# Patient Record
Sex: MALE | Race: WHITE | NOT HISPANIC OR LATINO
[De-identification: names, ages, dates, MRNs, and addresses within clinical notes are randomized per-mention and may not be internally consistent; named-entity substitution may affect disease eponyms.]

---

## 2017-02-01 ENCOUNTER — MEDICATION RENEWAL (OUTPATIENT)
Age: 62
End: 2017-02-01

## 2017-09-12 ENCOUNTER — NON-APPOINTMENT (OUTPATIENT)
Age: 62
End: 2017-09-12

## 2017-09-12 ENCOUNTER — APPOINTMENT (OUTPATIENT)
Dept: FAMILY MEDICINE | Facility: CLINIC | Age: 62
End: 2017-09-12
Payer: COMMERCIAL

## 2017-09-12 VITALS
SYSTOLIC BLOOD PRESSURE: 122 MMHG | BODY MASS INDEX: 29.66 KG/M2 | HEIGHT: 72 IN | WEIGHT: 219 LBS | DIASTOLIC BLOOD PRESSURE: 74 MMHG

## 2017-09-12 DIAGNOSIS — Z87.898 PERSONAL HISTORY OF OTHER SPECIFIED CONDITIONS: ICD-10-CM

## 2017-09-12 DIAGNOSIS — Z92.89 PERSONAL HISTORY OF OTHER MEDICAL TREATMENT: ICD-10-CM

## 2017-09-12 DIAGNOSIS — Z82.49 FAMILY HISTORY OF ISCHEMIC HEART DISEASE AND OTHER DISEASES OF THE CIRCULATORY SYSTEM: ICD-10-CM

## 2017-09-12 DIAGNOSIS — D49.0 NEOPLASM OF UNSPECIFIED BEHAVIOR OF DIGESTIVE SYSTEM: ICD-10-CM

## 2017-09-12 DIAGNOSIS — L98.9 DISORDER OF THE SKIN AND SUBCUTANEOUS TISSUE, UNSPECIFIED: ICD-10-CM

## 2017-09-12 DIAGNOSIS — Z83.3 FAMILY HISTORY OF DIABETES MELLITUS: ICD-10-CM

## 2017-09-12 DIAGNOSIS — Z82.3 FAMILY HISTORY OF STROKE: ICD-10-CM

## 2017-09-12 LAB
BILIRUB UR QL STRIP: NEGATIVE
GLUCOSE UR-MCNC: NEGATIVE
HGB UR QL STRIP.AUTO: NEGATIVE
KETONES UR-MCNC: NEGATIVE
LEUKOCYTE ESTERASE UR QL STRIP: NEGATIVE
NITRITE UR QL STRIP: NEGATIVE
PH UR STRIP: 6
SP GR UR STRIP: 1

## 2017-09-12 PROCEDURE — 90472 IMMUNIZATION ADMIN EACH ADD: CPT

## 2017-09-12 PROCEDURE — 81003 URINALYSIS AUTO W/O SCOPE: CPT | Mod: QW

## 2017-09-12 PROCEDURE — 99396 PREV VISIT EST AGE 40-64: CPT | Mod: 25

## 2017-09-12 PROCEDURE — 90686 IIV4 VACC NO PRSV 0.5 ML IM: CPT

## 2017-09-12 PROCEDURE — 93000 ELECTROCARDIOGRAM COMPLETE: CPT

## 2017-09-12 PROCEDURE — G0008: CPT

## 2017-09-12 PROCEDURE — 90670 PCV13 VACCINE IM: CPT

## 2017-09-12 PROCEDURE — 90688 IIV4 VACCINE SPLT 0.5 ML IM: CPT

## 2017-10-11 ENCOUNTER — RESULT REVIEW (OUTPATIENT)
Age: 62
End: 2017-10-11

## 2017-10-12 ENCOUNTER — MED ADMIN CHARGE (OUTPATIENT)
Age: 62
End: 2017-10-12

## 2017-10-12 ENCOUNTER — APPOINTMENT (OUTPATIENT)
Dept: FAMILY MEDICINE | Facility: CLINIC | Age: 62
End: 2017-10-12
Payer: COMMERCIAL

## 2017-10-12 ENCOUNTER — APPOINTMENT (OUTPATIENT)
Dept: DERMATOLOGY | Facility: CLINIC | Age: 62
End: 2017-10-12
Payer: COMMERCIAL

## 2017-10-12 VITALS
SYSTOLIC BLOOD PRESSURE: 123 MMHG | WEIGHT: 219.5 LBS | BODY MASS INDEX: 29.73 KG/M2 | DIASTOLIC BLOOD PRESSURE: 75 MMHG | HEIGHT: 72 IN

## 2017-10-12 PROCEDURE — 90736 HZV VACCINE LIVE SUBQ: CPT

## 2017-10-12 PROCEDURE — 90471 IMMUNIZATION ADMIN: CPT

## 2017-10-12 PROCEDURE — 11100 BX SKIN SUBCUTANEOUS&/MUCOUS MEMBRANE 1 LESION: CPT

## 2017-10-12 PROCEDURE — 99214 OFFICE O/P EST MOD 30 MIN: CPT | Mod: 25

## 2018-06-20 ENCOUNTER — APPOINTMENT (OUTPATIENT)
Dept: FAMILY MEDICINE | Facility: CLINIC | Age: 63
End: 2018-06-20
Payer: COMMERCIAL

## 2018-06-20 VITALS
HEIGHT: 72 IN | DIASTOLIC BLOOD PRESSURE: 76 MMHG | SYSTOLIC BLOOD PRESSURE: 118 MMHG | WEIGHT: 217 LBS | BODY MASS INDEX: 29.39 KG/M2

## 2018-06-20 DIAGNOSIS — Z92.29 PERSONAL HISTORY OF OTHER DRUG THERAPY: ICD-10-CM

## 2018-06-20 DIAGNOSIS — Z23 ENCOUNTER FOR IMMUNIZATION: ICD-10-CM

## 2018-06-20 PROCEDURE — 99214 OFFICE O/P EST MOD 30 MIN: CPT

## 2018-06-20 NOTE — HISTORY OF PRESENT ILLNESS
[FreeTextEntry1] : Pt here to review cardio consult from 10/2017. Pt states the cardiologist recommended him starting a statin and pt would like to discuss this further. Had coronary calcium score of 13.8 in 11/21/18. Pt prefers not to take a statin. He is not certain of the benefit given his current profile and is concerned for side effects. [de-identified] : No hx of cp, sob or palpitations. Never smoked.\par Brother age 74 to have CABG.

## 2018-06-20 NOTE — DATA REVIEWED
[FreeTextEntry1] : 10 yr ASCVD risk score is 7.7%\par Labs 9/14/17 is  Tot chol  168,  Trig  109,  HDL  50,  LDL 96\par Exercise Stress Test: Neg 1/12/17

## 2018-06-20 NOTE — PHYSICAL EXAM
[No Acute Distress] : no acute distress [Supple] : supple [No Respiratory Distress] : no respiratory distress  [Clear to Auscultation] : lungs were clear to auscultation bilaterally [Normal Rate] : normal rate  [Regular Rhythm] : with a regular rhythm [Normal S1, S2] : normal S1 and S2 [No Murmur] : no murmur heard [No Edema] : there was no peripheral edema

## 2018-06-20 NOTE — ASSESSMENT
[FreeTextEntry1] : Pt with intermediate 10 yr ASCVD risk by borderline result (<7.5% low risk).\par He is reluctant to take statin. LDL 96. Coronary Calcium 13.8. We discussed the medical data that supports statin therapy with proven cardiovascular risk reduction and that LDL <70 is a goal consistent with aggressive risk reduction. Pt is technically at intermediate cardiovascular risk based on 9/2017 labs. He is aware that deferring statin therapy will keep him at a higher risk. \par Recommend ASA 81 mg/d for cardiovascular risk reduction.\par Will base statin therapy recommendations on updated lab results and engage pt in decision making.\par Plant based diet shown to be beneficial in reducing atherosclerosis. Pt given copy of [employer] for healthy food choices.\par \par

## 2018-06-20 NOTE — HEALTH RISK ASSESSMENT
[No falls in past year] : Patient reported no falls in the past year [0] : 2) Feeling down, depressed, or hopeless: Not at all (0) [] : No [[id number]] : 0

## 2018-11-28 ENCOUNTER — APPOINTMENT (OUTPATIENT)
Dept: FAMILY MEDICINE | Facility: CLINIC | Age: 63
End: 2018-11-28
Payer: COMMERCIAL

## 2018-11-28 ENCOUNTER — MED ADMIN CHARGE (OUTPATIENT)
Age: 63
End: 2018-11-28

## 2018-11-28 VITALS
SYSTOLIC BLOOD PRESSURE: 118 MMHG | HEIGHT: 72 IN | HEART RATE: 59 BPM | WEIGHT: 214 LBS | BODY MASS INDEX: 28.99 KG/M2 | OXYGEN SATURATION: 96 % | DIASTOLIC BLOOD PRESSURE: 78 MMHG

## 2018-11-28 PROCEDURE — G0008: CPT

## 2018-11-28 PROCEDURE — 90686 IIV4 VACC NO PRSV 0.5 ML IM: CPT

## 2018-12-11 ENCOUNTER — APPOINTMENT (OUTPATIENT)
Dept: DERMATOLOGY | Facility: CLINIC | Age: 63
End: 2018-12-11
Payer: COMMERCIAL

## 2018-12-11 PROCEDURE — 99214 OFFICE O/P EST MOD 30 MIN: CPT

## 2018-12-13 ENCOUNTER — NON-APPOINTMENT (OUTPATIENT)
Age: 63
End: 2018-12-13

## 2018-12-13 ENCOUNTER — APPOINTMENT (OUTPATIENT)
Dept: FAMILY MEDICINE | Facility: CLINIC | Age: 63
End: 2018-12-13
Payer: COMMERCIAL

## 2018-12-13 VITALS — HEART RATE: 57 BPM | SYSTOLIC BLOOD PRESSURE: 130 MMHG | OXYGEN SATURATION: 100 % | DIASTOLIC BLOOD PRESSURE: 80 MMHG

## 2018-12-13 DIAGNOSIS — Z92.29 PERSONAL HISTORY OF OTHER DRUG THERAPY: ICD-10-CM

## 2018-12-13 LAB
BILIRUB UR QL STRIP: NEGATIVE
CLARITY UR: CLEAR
COLLECTION METHOD: NORMAL
GLUCOSE UR-MCNC: NEGATIVE
HCG UR QL: 0.2 EU/DL
HGB UR QL STRIP.AUTO: NEGATIVE
KETONES UR-MCNC: NEGATIVE
LEUKOCYTE ESTERASE UR QL STRIP: NEGATIVE
NITRITE UR QL STRIP: NEGATIVE
PH UR STRIP: 6
PROT UR STRIP-MCNC: NEGATIVE
SP GR UR STRIP: 1

## 2018-12-13 PROCEDURE — 90750 HZV VACC RECOMBINANT IM: CPT

## 2018-12-13 PROCEDURE — 81003 URINALYSIS AUTO W/O SCOPE: CPT | Mod: QW

## 2018-12-13 PROCEDURE — 90471 IMMUNIZATION ADMIN: CPT

## 2018-12-13 PROCEDURE — 99396 PREV VISIT EST AGE 40-64: CPT | Mod: 25

## 2018-12-13 PROCEDURE — 93000 ELECTROCARDIOGRAM COMPLETE: CPT

## 2018-12-13 RX ORDER — GABAPENTIN 100 MG/1
100 CAPSULE ORAL DAILY
Qty: 180 | Refills: 2 | Status: DISCONTINUED | COMMUNITY
End: 2018-12-13

## 2018-12-13 RX ORDER — ASPIRIN 81 MG/1
81 TABLET ORAL DAILY
Qty: 90 | Refills: 3 | Status: DISCONTINUED | COMMUNITY
Start: 2018-06-20 | End: 2018-12-13

## 2018-12-16 NOTE — DATA REVIEWED
[FreeTextEntry1] : EKG: sinus bradycardia 53 bpm. WNL. No acute change.\par 10 yr ASCVD calculated risk is 9.3 %.  Was 7.7%.\par Hx of coronary calcium score of 13.8.\par PSA 1.6 at urology office recently according to pt.

## 2018-12-16 NOTE — HISTORY OF PRESENT ILLNESS
[FreeTextEntry1] : Patient is here for a Physical.\par Pt c/o vague left parasternal sensation after exercising earlier in the week. No associated sob, palpitations or lightheadedness. Nonradiating, fleeting discomfort. Pt exercises regularly several times/wk including aerobic and strength training w/o cp, sob, palpitations or lightheadedness. [de-identified] : States diet is healthy and low cholesterol.\par He has concern for his cardiovascular risk and has questions regarding appropriate primary prevention. He recently stopped his ASA 81 mg/d based on reading about a study, in the news, showing limited benefit for prevention in people w/o known cardiac disease (Presumbably the ASPREE study).

## 2018-12-16 NOTE — HEALTH RISK ASSESSMENT
[No falls in past year] : Patient reported no falls in the past year [0] : 2) Feeling down, depressed, or hopeless: Not at all (0) [] : No [de-identified] : socially  [DCM0Tbhys] : 0 [ColonoscopyDate] : 07/2016 [ColonoscopyComments] : Dr. Matthew Araiza.

## 2018-12-16 NOTE — ASSESSMENT
[FreeTextEntry1] : Risks, benefits and potential side effects of the Shingrix vaccine was reviewed with the patient, including risk for low grade fever, myalgias, fatigue, malaise, headache and redness or soreness at the site of the injection. The pt is aware that two shots are recommended, two to six months apart, to complete the Shingrix vaccination series.\par Mild elevation in BP today.  Chest pain episode was fleeting and atypical for cardiac related pain.The EKG is within normal limits. Pt is advised to f/u with his cardiologist. Recommend F/U in 3 months. \par \par Pt at intermediate cardiovascular risk with CAC score of 13.8 and + FHx of CAD. Would recommend low dose statin based on current AHA guidelines for primary prevention. Pt would like an opinion from his cardiologist on this subject.  Will defer ASA based on ASPREE study results in context of no known current active cardiovascular disease. Further recommendations pending bw.\par

## 2019-01-08 ENCOUNTER — TRANSCRIPTION ENCOUNTER (OUTPATIENT)
Age: 64
End: 2019-01-08

## 2019-01-10 ENCOUNTER — TRANSCRIPTION ENCOUNTER (OUTPATIENT)
Age: 64
End: 2019-01-10

## 2019-01-14 ENCOUNTER — MEDICATION RENEWAL (OUTPATIENT)
Age: 64
End: 2019-01-14

## 2019-03-05 ENCOUNTER — TRANSCRIPTION ENCOUNTER (OUTPATIENT)
Age: 64
End: 2019-03-05

## 2019-04-08 ENCOUNTER — RX RENEWAL (OUTPATIENT)
Age: 64
End: 2019-04-08

## 2019-04-11 ENCOUNTER — APPOINTMENT (OUTPATIENT)
Dept: FAMILY MEDICINE | Facility: CLINIC | Age: 64
End: 2019-04-11

## 2019-04-18 ENCOUNTER — APPOINTMENT (OUTPATIENT)
Dept: FAMILY MEDICINE | Facility: CLINIC | Age: 64
End: 2019-04-18

## 2019-04-24 ENCOUNTER — APPOINTMENT (OUTPATIENT)
Dept: FAMILY MEDICINE | Facility: CLINIC | Age: 64
End: 2019-04-24
Payer: COMMERCIAL

## 2019-04-24 VITALS
DIASTOLIC BLOOD PRESSURE: 70 MMHG | SYSTOLIC BLOOD PRESSURE: 120 MMHG | BODY MASS INDEX: 29.19 KG/M2 | HEART RATE: 51 BPM | WEIGHT: 215.5 LBS | OXYGEN SATURATION: 99 % | HEIGHT: 72 IN

## 2019-04-24 PROCEDURE — 90471 IMMUNIZATION ADMIN: CPT

## 2019-04-24 PROCEDURE — 90750 HZV VACC RECOMBINANT IM: CPT

## 2019-05-01 ENCOUNTER — APPOINTMENT (OUTPATIENT)
Dept: FAMILY MEDICINE | Facility: CLINIC | Age: 64
End: 2019-05-01

## 2019-05-09 ENCOUNTER — RX CHANGE (OUTPATIENT)
Age: 64
End: 2019-05-09

## 2019-05-09 ENCOUNTER — RX RENEWAL (OUTPATIENT)
Age: 64
End: 2019-05-09

## 2019-06-19 ENCOUNTER — TRANSCRIPTION ENCOUNTER (OUTPATIENT)
Age: 64
End: 2019-06-19

## 2019-07-25 ENCOUNTER — TRANSCRIPTION ENCOUNTER (OUTPATIENT)
Age: 64
End: 2019-07-25

## 2019-07-26 ENCOUNTER — TRANSCRIPTION ENCOUNTER (OUTPATIENT)
Age: 64
End: 2019-07-26

## 2019-07-29 ENCOUNTER — APPOINTMENT (OUTPATIENT)
Dept: FAMILY MEDICINE | Facility: CLINIC | Age: 64
End: 2019-07-29
Payer: COMMERCIAL

## 2019-07-29 VITALS
RESPIRATION RATE: 12 BRPM | OXYGEN SATURATION: 99 % | DIASTOLIC BLOOD PRESSURE: 70 MMHG | BODY MASS INDEX: 27.77 KG/M2 | HEART RATE: 54 BPM | WEIGHT: 205 LBS | HEIGHT: 72 IN | TEMPERATURE: 97.1 F | SYSTOLIC BLOOD PRESSURE: 110 MMHG

## 2019-07-29 DIAGNOSIS — Z87.39 PERSONAL HISTORY OF OTHER DISEASES OF THE MUSCULOSKELETAL SYSTEM AND CONNECTIVE TISSUE: ICD-10-CM

## 2019-07-29 DIAGNOSIS — M75.51 BURSITIS OF RIGHT SHOULDER: ICD-10-CM

## 2019-07-29 PROCEDURE — 36415 COLL VENOUS BLD VENIPUNCTURE: CPT

## 2019-07-29 PROCEDURE — 99214 OFFICE O/P EST MOD 30 MIN: CPT | Mod: 25

## 2019-07-29 RX ORDER — MULTIVIT-MIN/FOLIC/VIT K/LYCOP 400-300MCG
25 MCG TABLET ORAL DAILY
Refills: 0 | Status: DISCONTINUED | COMMUNITY
End: 2019-07-29

## 2019-07-29 RX ORDER — ALLOPURINOL 200 MG/1
TABLET ORAL EVERY OTHER DAY
Refills: 0 | Status: DISCONTINUED | COMMUNITY
End: 2019-07-29

## 2019-07-30 ENCOUNTER — RESULT REVIEW (OUTPATIENT)
Age: 64
End: 2019-07-30

## 2019-07-30 LAB
25(OH)D3 SERPL-MCNC: 35.3 NG/ML
ALBUMIN SERPL ELPH-MCNC: 4.6 G/DL
ALP BLD-CCNC: 56 U/L
ALT SERPL-CCNC: 23 U/L
ANION GAP SERPL CALC-SCNC: 13 MMOL/L
AST SERPL-CCNC: 35 U/L
BASOPHILS # BLD AUTO: 0.08 K/UL
BASOPHILS NFR BLD AUTO: 1.2 %
BILIRUB SERPL-MCNC: 0.8 MG/DL
BUN SERPL-MCNC: 10 MG/DL
CALCIUM SERPL-MCNC: 10.1 MG/DL
CHLORIDE SERPL-SCNC: 105 MMOL/L
CHOLEST SERPL-MCNC: 169 MG/DL
CHOLEST/HDLC SERPL: 3.3 RATIO
CO2 SERPL-SCNC: 23 MMOL/L
CREAT SERPL-MCNC: 0.94 MG/DL
EOSINOPHIL # BLD AUTO: 0.23 K/UL
EOSINOPHIL NFR BLD AUTO: 3.4 %
ESTIMATED AVERAGE GLUCOSE: 105 MG/DL
GLUCOSE SERPL-MCNC: 82 MG/DL
HBA1C MFR BLD HPLC: 5.3 %
HCT VFR BLD CALC: 48.6 %
HDLC SERPL-MCNC: 51 MG/DL
HGB BLD-MCNC: 15.7 G/DL
IMM GRANULOCYTES NFR BLD AUTO: 0.4 %
LDLC SERPL CALC-MCNC: 95 MG/DL
LYMPHOCYTES # BLD AUTO: 2.07 K/UL
LYMPHOCYTES NFR BLD AUTO: 30.2 %
MAN DIFF?: NORMAL
MCHC RBC-ENTMCNC: 29.2 PG
MCHC RBC-ENTMCNC: 32.3 GM/DL
MCV RBC AUTO: 90.3 FL
MONOCYTES # BLD AUTO: 0.49 K/UL
MONOCYTES NFR BLD AUTO: 7.1 %
NEUTROPHILS # BLD AUTO: 3.96 K/UL
NEUTROPHILS NFR BLD AUTO: 57.7 %
PLATELET # BLD AUTO: 205 K/UL
POTASSIUM SERPL-SCNC: 5.1 MMOL/L
PROT SERPL-MCNC: 7 G/DL
RBC # BLD: 5.38 M/UL
RBC # FLD: 13.8 %
SODIUM SERPL-SCNC: 141 MMOL/L
TRIGL SERPL-MCNC: 116 MG/DL
URATE SERPL-MCNC: 5.8 MG/DL
WBC # FLD AUTO: 6.86 K/UL

## 2019-07-30 NOTE — PLAN
[FreeTextEntry1] : Advised to continue medications as directed. Will start new regime of alternating Allopurinol 100 mg/200 mg as directed.\par Life style and diet modifications were discussed

## 2019-07-30 NOTE — REVIEW OF SYSTEMS
[Joint Pain] : joint pain [Headache] : headache [Joint Swelling] : joint swelling [Back Pain] : back pain [Negative] : Psychiatric

## 2019-07-30 NOTE — PHYSICAL EXAM
[No Acute Distress] : no acute distress [Well Developed] : well developed [Well Nourished] : well nourished [Normal Sclera/Conjunctiva] : normal sclera/conjunctiva [Normal Outer Ear/Nose] : the outer ears and nose were normal in appearance [EOMI] : extraocular movements intact [PERRL] : pupils equal round and reactive to light [Normal Oropharynx] : the oropharynx was normal [No Respiratory Distress] : no respiratory distress  [Supple] : supple [No Lymphadenopathy] : no lymphadenopathy [Clear to Auscultation] : lungs were clear to auscultation bilaterally [No Accessory Muscle Use] : no accessory muscle use [Normal Rate] : normal rate  [Normal S1, S2] : normal S1 and S2 [Regular Rhythm] : with a regular rhythm [Normal Posterior Cervical Nodes] : no posterior cervical lymphadenopathy [Normal Anterior Cervical Nodes] : no anterior cervical lymphadenopathy [Grossly Normal Strength/Tone] : grossly normal strength/tone [Coordination Grossly Intact] : coordination grossly intact [Normal Gait] : normal gait [Normal Affect] : the affect was normal [Normal Insight/Judgement] : insight and judgment were intact [de-identified] : Lipoma as stated in HPI

## 2019-07-30 NOTE — COUNSELING
[Healthy eating counseling provided] : healthy eating [Weight management counseling provided] : Weight management [Activity counseling provided] : activity [Good understanding] : Patient has a good understanding of disease, goals and obesity follow-up plan [Behavioral health counseling provided] : behavioral health  [None] : None

## 2019-07-30 NOTE — HISTORY OF PRESENT ILLNESS
[de-identified] : Stated better control w/300 mg daily.Diet is good and drinking water daily. Compliant w/medications\par Lipoma over approx C3-C6 area over vertebral column x years. Discussed possibility that is the cause of compression over spinal nerve roots. Patient will have new evaluation with Physiatrist. [FreeTextEntry1] : Pt is here for follow up Gout.  Pt needs Rx refill, pt was taking 300 mg of the Allopurinol which was recommended by Rheumatology.  Pt last visit here dose was decreased to 100 mg qd .  Pt stated  that at this dose Gout is not well under control and having frequent flare ups.  Pt needs blood work.

## 2019-07-30 NOTE — HEALTH RISK ASSESSMENT
[Yes] : Yes [2 - 4 times a month (2 pts)] : 2-4 times a month (2 points) [Never (0 pts)] : Never (0 points) [No] : In the past 12 months have you used drugs other than those required for medical reasons? No [No falls in past year] : Patient reported no falls in the past year [] : No

## 2019-10-04 ENCOUNTER — APPOINTMENT (OUTPATIENT)
Dept: FAMILY MEDICINE | Facility: CLINIC | Age: 64
End: 2019-10-04
Payer: COMMERCIAL

## 2019-10-04 ENCOUNTER — NON-APPOINTMENT (OUTPATIENT)
Age: 64
End: 2019-10-04

## 2019-10-04 VITALS
BODY MASS INDEX: 28.17 KG/M2 | DIASTOLIC BLOOD PRESSURE: 80 MMHG | HEART RATE: 60 BPM | WEIGHT: 208 LBS | HEIGHT: 72 IN | SYSTOLIC BLOOD PRESSURE: 110 MMHG | OXYGEN SATURATION: 99 %

## 2019-10-04 DIAGNOSIS — Z23 ENCOUNTER FOR IMMUNIZATION: ICD-10-CM

## 2019-10-04 PROCEDURE — 99213 OFFICE O/P EST LOW 20 MIN: CPT | Mod: 25

## 2019-10-04 PROCEDURE — G0008: CPT

## 2019-10-04 PROCEDURE — 90674 CCIIV4 VAC NO PRSV 0.5 ML IM: CPT

## 2019-10-04 PROCEDURE — 81003 URINALYSIS AUTO W/O SCOPE: CPT | Mod: QW

## 2019-10-04 PROCEDURE — 99396 PREV VISIT EST AGE 40-64: CPT | Mod: 25

## 2019-10-05 NOTE — DATA REVIEWED
[FreeTextEntry1] : MRI c-spine 1/28/16: Bulging disc and osteophytic spurs together with narrowing of both neural foramina at C5-C6. Bulgin disc and osteophytic spurs together with narrowing of the right neural foramen at C4-C5. degenerative disc disease from C2-T1 more severe from C3-C7 with active degenerative disc disease at C4-C5. Schmorl's nodes involving the endplates at C4-C5. No evidence of herniated disc or central spinal canal stenosis.\par \par EKG: Sinus bradycardia 50 bpm. RSR' V1. Nonspecific T wave abnormality. No acute or interval change.

## 2019-10-05 NOTE — PHYSICAL EXAM
[No Acute Distress] : no acute distress [Well Nourished] : well nourished [Well-Appearing] : well-appearing [Well Developed] : well developed [Normal Sclera/Conjunctiva] : normal sclera/conjunctiva [EOMI] : extraocular movements intact [PERRL] : pupils equal round and reactive to light [Normal Outer Ear/Nose] : the outer ears and nose were normal in appearance [No JVD] : no jugular venous distention [Normal Oropharynx] : the oropharynx was normal [Thyroid Normal, No Nodules] : the thyroid was normal and there were no nodules present [No Lymphadenopathy] : no lymphadenopathy [Supple] : supple [No Accessory Muscle Use] : no accessory muscle use [No Respiratory Distress] : no respiratory distress  [Clear to Auscultation] : lungs were clear to auscultation bilaterally [Regular Rhythm] : with a regular rhythm [Normal S1, S2] : normal S1 and S2 [Normal Rate] : normal rate  [No Abdominal Bruit] : a ~M bruit was not heard ~T in the abdomen [No Carotid Bruits] : no carotid bruits [No Murmur] : no murmur heard [No Varicosities] : no varicosities [Pedal Pulses Present] : the pedal pulses are present [No Extremity Clubbing/Cyanosis] : no extremity clubbing/cyanosis [No Palpable Aorta] : no palpable aorta [No Edema] : there was no peripheral edema [Non-distended] : non-distended [Soft] : abdomen soft [Non Tender] : non-tender [No Masses] : no abdominal mass palpated [No HSM] : no HSM [Normal Bowel Sounds] : normal bowel sounds [No CVA Tenderness] : no CVA  tenderness [Normal Anterior Cervical Nodes] : no anterior cervical lymphadenopathy [Normal Posterior Cervical Nodes] : no posterior cervical lymphadenopathy [No Spinal Tenderness] : no spinal tenderness [No Joint Swelling] : no joint swelling [Grossly Normal Strength/Tone] : grossly normal strength/tone [No Focal Deficits] : no focal deficits [Coordination Grossly Intact] : coordination grossly intact [No Rash] : no rash [Deep Tendon Reflexes (DTR)] : deep tendon reflexes were 2+ and symmetric [Normal Gait] : normal gait [Normal Insight/Judgement] : insight and judgment were intact [Normal Mood] : the mood was normal [Normal Affect] : the affect was normal [de-identified] : Increased right paracervical tone with mild tenderness to palpation. There is an approx 5 cm soft tissue mass midline at the T1-T3 region, nontender to palpation.

## 2019-10-05 NOTE — HEALTH RISK ASSESSMENT
[Yes] : Yes [No falls in past year] : Patient reported no falls in the past year [0] : 1) Little interest or pleasure doing things: Not at all (0) [] : No [ADR1Xvnfh] : 0 [de-identified] : socially  [ColonoscopyComments] : polypectomy done. Repeat recommended in 3 yrs. [MammogramDate] : 10/17 [ColonoscopyDate] : 08/16

## 2019-10-05 NOTE — ASSESSMENT
[FreeTextEntry1] : Repeat MRI to r/o progressive DDD / disc herniation.\par Check MRI soft tissue mass of upper thorax, midline. Pt is contemplating having this surgically removed as he is beginning to have some pain in the area.\par Advil prn pain.\par F/U after MRIs.\par Trial of PT for chronic neck and shoulder pain.\par Consider chiropractic.

## 2019-10-05 NOTE — HISTORY OF PRESENT ILLNESS
[FreeTextEntry1] : Pt is here for a physical. \par c/o chronic neck and right shoulder pain, present for years but worse over the past 3 months.\par c/o increase in size of soft tissue mass at midline, base of the neck, upper thorax. He has had it for many years but he feels it may be getting a little larger lately.  He is wondering if it is contributing to the headaches he is experiencing. He had chronic muscle tension arising from the base of the neck posteriorly and radiating up to the right side of the head. He has a hx of degenerative changes in the cervical spine with bulging discs. His last MRI of the c-spine was in 2016 [de-identified] : Considers his diet to be healthy.\par Exercises regularly, including strength training.\par Saw cardio for evaluation in 3/2019. Has coronary artery calcium score of 13.7 in 11/2017. Low cardiac risk. Statin not recommended based on CAC and optimal lipid profile.\par Sees urologist annually. PSA followed by urologist.\par

## 2019-10-10 ENCOUNTER — TRANSCRIPTION ENCOUNTER (OUTPATIENT)
Age: 64
End: 2019-10-10

## 2019-10-11 ENCOUNTER — TRANSCRIPTION ENCOUNTER (OUTPATIENT)
Age: 64
End: 2019-10-11

## 2019-10-21 DIAGNOSIS — M25.78 OSTEOPHYTE, VERTEBRAE: ICD-10-CM

## 2019-10-23 ENCOUNTER — RESULT CHARGE (OUTPATIENT)
Age: 64
End: 2019-10-23

## 2019-10-23 ENCOUNTER — APPOINTMENT (OUTPATIENT)
Dept: FAMILY MEDICINE | Facility: CLINIC | Age: 64
End: 2019-10-23
Payer: COMMERCIAL

## 2019-10-23 DIAGNOSIS — M99.81 OTHER BIOMECHANICAL LESIONS OF CERVICAL REGION: ICD-10-CM

## 2019-10-23 PROCEDURE — 99213 OFFICE O/P EST LOW 20 MIN: CPT | Mod: 25

## 2019-10-23 PROCEDURE — 69210 REMOVE IMPACTED EAR WAX UNI: CPT

## 2019-10-24 PROBLEM — M99.81 NEURAL FORAMINAL STENOSIS OF CERVICAL SPINE: Status: ACTIVE | Noted: 2019-10-21

## 2019-10-24 NOTE — PHYSICAL EXAM
[No Focal Deficits] : no focal deficits [de-identified] : Cerumen impaction b/l, cleared with irrigationa and currettage b/l. [de-identified] : Increased paracervical tone R>L

## 2019-10-24 NOTE — HISTORY OF PRESENT ILLNESS
[FreeTextEntry1] : Pt c/o difficulty hearing and ears clogged.\par c/o persistent pain to right posterior neck radiating up to skull.\par Had MRI images of soft tissue mass of posterior thorax, and of his neck.  used

## 2019-10-25 ENCOUNTER — APPOINTMENT (OUTPATIENT)
Dept: PHYSICAL MEDICINE AND REHAB | Facility: CLINIC | Age: 64
End: 2019-10-25
Payer: COMMERCIAL

## 2019-10-25 VITALS
BODY MASS INDEX: 28.44 KG/M2 | HEART RATE: 60 BPM | WEIGHT: 210 LBS | HEIGHT: 72 IN | RESPIRATION RATE: 14 BRPM | DIASTOLIC BLOOD PRESSURE: 87 MMHG | SYSTOLIC BLOOD PRESSURE: 129 MMHG

## 2019-10-25 VITALS
BODY MASS INDEX: 28.44 KG/M2 | HEIGHT: 72 IN | SYSTOLIC BLOOD PRESSURE: 129 MMHG | WEIGHT: 210 LBS | DIASTOLIC BLOOD PRESSURE: 87 MMHG

## 2019-10-25 PROCEDURE — 99204 OFFICE O/P NEW MOD 45 MIN: CPT

## 2019-10-25 NOTE — DATA REVIEWED
[MRI] : MRI [FreeTextEntry1] : Available imaging including MRI and prior fluoroscopic imaging was personally reviewed and discussed with the patient.\par

## 2019-10-25 NOTE — PHYSICAL EXAM
[FreeTextEntry1] : General: NAD, alert\par Psych: normal mood and affect\par HEENT: NC/AT, normal visual tracking\par Pulmonary: no resp distress, chest expansion appears symmetrical\par CV: extremities are warm and perfused\par Abd: non-distended\par Ext: no c/c/e\par normal skin color and appearance\par \par Cervical Spine/Shoulder:\par Inspection: large midline lipoma over the upper traps and midline cervical spine, normal muscle bulk without asymmetry\par Tenderness to palpation: minimal over the upper traps on the right adjacent to lipoma otherwise none noted over levator scapulae, rhomboids, spinous process, AC joint, subacromial, biceps groove\par ROM: within functional limits and with discomfort in right sided lateral rotation\par MMT: 5/5 bilateral upper extremities\par Reflexes: symmetric bilateral biceps, triceps \par Sensory: intact to light touch in all dermatomes of the bilateral upper extremities.\par Provacative testing:\par Spurlings negative \par Cherry’s negative\par

## 2019-10-25 NOTE — HISTORY OF PRESENT ILLNESS
[FreeTextEntry1] : Mr. RICHARD MAGNANI is a 63 year year old male here for evaluation of neck and occipital headaches. The pain has been chronic, intermittent episodes of severe exacerbation. She reports she multiple neurologist in the past, has a self reported history of possible strain goals in the distribution of pain. He has taking gabapentin and in the past which have helped with his symptoms, but had stop the medication. \par \par Location: Right-sided neck, right occipital\par Duration: Chronic\par Inciting Event/Context: no specific inciting event or trauma, There is an unclear history of possible shingles\par Onset/Timing: Intermittent\par Quality: Sharp, pulsing, possible burning\par Severity: 2-3/10 \par Exacerbating factors: unclear what exacerbates his pain\par Relieving factors: gabapentin\par Radiation: Radiates to the right greater occipital, lesser occipital distribution\par MRI of his cervical spine with associated right-sided cervical spondylosis\par He has an associated posterior large light home over lying his room cervical paraspinal, upper traps, which contributes to his pain during an exacerbation\par Numbness/Tingling: in distribution of radiation\par He denies any radicular pain down his arm\par Bowel/Bladder neurological incontinence:Denies\par upper ext. weakness:Denies\par \par Prior Treatments:\par he has seen multiple neurologist prior\par Injections: no prior injections\par Surgeries: no prior cervical surgery\par Medications: was taking down and in the past with partial relief\par Imaging: MRI cervical spine as above

## 2019-10-25 NOTE — ASSESSMENT
[FreeTextEntry1] : Mr. MAGNANI is a 63 year year old man here for evaluation of neck pain and posterior headaches. Based on the clinical evaluation, review of available imaging, pain is likely secondary to Right-sided greater occipital neuralgia, cervical spondylosis, myofascial pain, cervicogenic headache. Currently, he has minimal pain, minimal headache. At this time recommend conservative management with anti-inflammatory pain medication. He was instructed to take Indocin 25 mg p.o. b.i.d. when he has a painful exacerbation. We did discuss an ultrasound guided greater occipital nerve block for ongoing headaches should he have a severe exacerbation. A referral was provided for him to follow general surgery for a consideration of lipoma resection as he has discomfort and pain over the the muscle surrounding the lipoma. He will follow with me as needed.

## 2019-11-14 DIAGNOSIS — N52.9 MALE ERECTILE DYSFUNCTION, UNSPECIFIED: ICD-10-CM

## 2020-01-14 ENCOUNTER — TRANSCRIPTION ENCOUNTER (OUTPATIENT)
Age: 65
End: 2020-01-14

## 2020-01-17 ENCOUNTER — TRANSCRIPTION ENCOUNTER (OUTPATIENT)
Age: 65
End: 2020-01-17

## 2020-01-20 ENCOUNTER — TRANSCRIPTION ENCOUNTER (OUTPATIENT)
Age: 65
End: 2020-01-20

## 2020-01-22 ENCOUNTER — TRANSCRIPTION ENCOUNTER (OUTPATIENT)
Age: 65
End: 2020-01-22

## 2020-02-13 NOTE — ASSESSMENT
[FreeTextEntry1] : Recommend neurosurgery consult due to chronic neck pain and severe foraminal stenosis.\par Also, pt wishes to have soft tissue mass, which is adipose tissue, removed.
No

## 2020-03-03 ENCOUNTER — TRANSCRIPTION ENCOUNTER (OUTPATIENT)
Age: 65
End: 2020-03-03

## 2020-04-15 ENCOUNTER — RX CHANGE (OUTPATIENT)
Age: 65
End: 2020-04-15

## 2020-04-15 ENCOUNTER — TRANSCRIPTION ENCOUNTER (OUTPATIENT)
Age: 65
End: 2020-04-15

## 2020-04-15 ENCOUNTER — RX RENEWAL (OUTPATIENT)
Age: 65
End: 2020-04-15

## 2020-04-17 ENCOUNTER — RX RENEWAL (OUTPATIENT)
Age: 65
End: 2020-04-17

## 2020-04-29 ENCOUNTER — RX RENEWAL (OUTPATIENT)
Age: 65
End: 2020-04-29

## 2020-04-30 ENCOUNTER — RX RENEWAL (OUTPATIENT)
Age: 65
End: 2020-04-30

## 2020-05-07 ENCOUNTER — APPOINTMENT (OUTPATIENT)
Dept: FAMILY MEDICINE | Facility: CLINIC | Age: 65
End: 2020-05-07
Payer: COMMERCIAL

## 2020-05-07 ENCOUNTER — APPOINTMENT (OUTPATIENT)
Dept: FAMILY MEDICINE | Facility: CLINIC | Age: 65
End: 2020-05-07

## 2020-05-07 DIAGNOSIS — Z92.29 PERSONAL HISTORY OF OTHER DRUG THERAPY: ICD-10-CM

## 2020-05-07 PROCEDURE — 99442: CPT

## 2020-05-14 LAB
ALBUMIN SERPL ELPH-MCNC: 4.4 G/DL
ALP BLD-CCNC: 56 U/L
ALT SERPL-CCNC: 21 U/L
ANION GAP SERPL CALC-SCNC: 12 MMOL/L
AST SERPL-CCNC: 26 U/L
BASOPHILS # BLD AUTO: 0.08 K/UL
BASOPHILS NFR BLD AUTO: 1.1 %
BILIRUB SERPL-MCNC: 0.4 MG/DL
BUN SERPL-MCNC: 18 MG/DL
CALCIUM SERPL-MCNC: 9.7 MG/DL
CHLORIDE SERPL-SCNC: 105 MMOL/L
CHOLEST SERPL-MCNC: 173 MG/DL
CHOLEST/HDLC SERPL: 3.2 RATIO
CO2 SERPL-SCNC: 26 MMOL/L
CREAT SERPL-MCNC: 0.88 MG/DL
EOSINOPHIL # BLD AUTO: 0.27 K/UL
EOSINOPHIL NFR BLD AUTO: 3.8 %
GLUCOSE SERPL-MCNC: 91 MG/DL
HCT VFR BLD CALC: 47 %
HDLC SERPL-MCNC: 54 MG/DL
HGB BLD-MCNC: 15.3 G/DL
IMM GRANULOCYTES NFR BLD AUTO: 0.3 %
LDLC SERPL CALC-MCNC: 101 MG/DL
LYMPHOCYTES # BLD AUTO: 2.13 K/UL
LYMPHOCYTES NFR BLD AUTO: 30 %
MAN DIFF?: NORMAL
MCHC RBC-ENTMCNC: 29.1 PG
MCHC RBC-ENTMCNC: 32.6 GM/DL
MCV RBC AUTO: 89.4 FL
MONOCYTES # BLD AUTO: 0.57 K/UL
MONOCYTES NFR BLD AUTO: 8 %
NEUTROPHILS # BLD AUTO: 4.04 K/UL
NEUTROPHILS NFR BLD AUTO: 56.8 %
PLATELET # BLD AUTO: 228 K/UL
POTASSIUM SERPL-SCNC: 4.4 MMOL/L
PROT SERPL-MCNC: 6.5 G/DL
RBC # BLD: 5.26 M/UL
RBC # FLD: 12.9 %
SODIUM SERPL-SCNC: 142 MMOL/L
TRIGL SERPL-MCNC: 89 MG/DL
TSH SERPL-ACNC: 2.3 UIU/ML
URATE SERPL-MCNC: 5.3 MG/DL
WBC # FLD AUTO: 7.11 K/UL

## 2020-05-15 ENCOUNTER — TRANSCRIPTION ENCOUNTER (OUTPATIENT)
Age: 65
End: 2020-05-15

## 2020-06-10 DIAGNOSIS — M50.223 OTHER CERVICAL DISC DISPLACEMENT AT C6-C7 LEVEL: ICD-10-CM

## 2020-06-11 ENCOUNTER — TRANSCRIPTION ENCOUNTER (OUTPATIENT)
Age: 65
End: 2020-06-11

## 2020-06-25 ENCOUNTER — TRANSCRIPTION ENCOUNTER (OUTPATIENT)
Age: 65
End: 2020-06-25

## 2020-07-02 ENCOUNTER — APPOINTMENT (OUTPATIENT)
Dept: FAMILY MEDICINE | Facility: CLINIC | Age: 65
End: 2020-07-02
Payer: COMMERCIAL

## 2020-07-02 PROCEDURE — 99213 OFFICE O/P EST LOW 20 MIN: CPT | Mod: 95

## 2020-07-02 RX ORDER — ALLOPURINOL 100 MG/1
100 TABLET ORAL
Qty: 90 | Refills: 1 | Status: DISCONTINUED | COMMUNITY
Start: 2019-04-08 | End: 2020-07-02

## 2020-07-02 NOTE — PHYSICAL EXAM
[No Acute Distress] : no acute distress [No Respiratory Distress] : no respiratory distress  [Normal] : affect was normal and insight and judgment were intact

## 2020-10-09 ENCOUNTER — APPOINTMENT (OUTPATIENT)
Dept: FAMILY MEDICINE | Facility: CLINIC | Age: 65
End: 2020-10-09
Payer: COMMERCIAL

## 2020-10-09 ENCOUNTER — NON-APPOINTMENT (OUTPATIENT)
Age: 65
End: 2020-10-09

## 2020-10-09 VITALS
HEART RATE: 54 BPM | WEIGHT: 208 LBS | HEIGHT: 72 IN | DIASTOLIC BLOOD PRESSURE: 72 MMHG | TEMPERATURE: 97.3 F | OXYGEN SATURATION: 98 % | BODY MASS INDEX: 28.17 KG/M2 | SYSTOLIC BLOOD PRESSURE: 118 MMHG

## 2020-10-09 DIAGNOSIS — B07.9 VIRAL WART, UNSPECIFIED: ICD-10-CM

## 2020-10-09 DIAGNOSIS — Z00.00 ENCOUNTER FOR GENERAL ADULT MEDICAL EXAMINATION W/OUT ABNORMAL FINDINGS: ICD-10-CM

## 2020-10-09 DIAGNOSIS — Z13.6 ENCOUNTER FOR SCREENING FOR CARDIOVASCULAR DISORDERS: ICD-10-CM

## 2020-10-09 DIAGNOSIS — M75.81 OTHER SHOULDER LESIONS, RIGHT SHOULDER: ICD-10-CM

## 2020-10-09 DIAGNOSIS — Z80.8 FAMILY HISTORY OF MALIGNANT NEOPLASM OF OTHER ORGANS OR SYSTEMS: ICD-10-CM

## 2020-10-09 DIAGNOSIS — M47.814 SPONDYLOSIS W/OUT MYELOPATHY OR RADICULOPATHY, THORACIC REGION: ICD-10-CM

## 2020-10-09 DIAGNOSIS — Z82.49 FAMILY HISTORY OF ISCHEMIC HEART DISEASE AND OTHER DISEASES OF THE CIRCULATORY SYSTEM: ICD-10-CM

## 2020-10-09 DIAGNOSIS — Z87.39 PERSONAL HISTORY OF OTHER DISEASES OF THE MUSCULOSKELETAL SYSTEM AND CONNECTIVE TISSUE: ICD-10-CM

## 2020-10-09 DIAGNOSIS — Z82.0 FAMILY HISTORY OF EPILEPSY AND OTHER DISEASES OF THE NERVOUS SYSTEM: ICD-10-CM

## 2020-10-09 DIAGNOSIS — Z80.3 FAMILY HISTORY OF MALIGNANT NEOPLASM OF BREAST: ICD-10-CM

## 2020-10-09 LAB
BILIRUB UR QL STRIP: NEGATIVE
GLUCOSE UR-MCNC: NEGATIVE
HCG UR QL: 0.2 EU/DL
HGB UR QL STRIP.AUTO: NEGATIVE
KETONES UR-MCNC: NEGATIVE
LEUKOCYTE ESTERASE UR QL STRIP: NEGATIVE
NITRITE UR QL STRIP: NEGATIVE
PH UR STRIP: 5.5
PROT UR STRIP-MCNC: NEGATIVE
SP GR UR STRIP: 1.01

## 2020-10-09 PROCEDURE — 99396 PREV VISIT EST AGE 40-64: CPT | Mod: 25

## 2020-10-09 PROCEDURE — 93000 ELECTROCARDIOGRAM COMPLETE: CPT

## 2020-10-09 PROCEDURE — 90686 IIV4 VACC NO PRSV 0.5 ML IM: CPT

## 2020-10-09 PROCEDURE — 99213 OFFICE O/P EST LOW 20 MIN: CPT | Mod: 25

## 2020-10-09 PROCEDURE — 81003 URINALYSIS AUTO W/O SCOPE: CPT | Mod: QW

## 2020-10-09 PROCEDURE — G0008: CPT

## 2020-10-09 RX ORDER — INDOMETHACIN 25 MG/1
25 CAPSULE ORAL
Qty: 60 | Refills: 0 | Status: DISCONTINUED | COMMUNITY
Start: 2019-10-25 | End: 2020-10-09

## 2020-10-09 NOTE — PHYSICAL EXAM
[No Acute Distress] : no acute distress [Normal Sclera/Conjunctiva] : normal sclera/conjunctiva [No JVD] : no jugular venous distention [No Lymphadenopathy] : no lymphadenopathy [Supple] : supple [Thyroid Normal, No Nodules] : the thyroid was normal and there were no nodules present [No Respiratory Distress] : no respiratory distress  [No Carotid Bruits] : no carotid bruits [No Varicosities] : no varicosities [Pedal Pulses Present] : the pedal pulses are present [No Edema] : there was no peripheral edema [Examination Of The Breasts] : a normal appearance [No Discharge] : no discharge [Breast Mass Right Breast ___cm] : no was mass palpable [___cm] : a ~M [unfilled] ~Ucm inferior medial quadrant mass was palpated [Firm] : firm [Mobile] : mobile [Nontender] : nontender [6:00] : in the 6:00 position [___cm Radial Distance from the Nipple] : [unfilled] ~Ucm radially from the nipple [Axillary Lymph Nodes Enlarged Bilaterally] : no enlarged nodes [No Joint Swelling] : no joint swelling [No Focal Deficits] : no focal deficits [Normal] : affect was normal and insight and judgment were intact [de-identified] : Right shoulder flexion 160 degrees with pain.  Abduction 170 degrees with pain.  Tenderness over the bicipital groove.  Increase paracervical spasm bilaterally left greater than right.  Approximately 10 cm soft tissue mass at the lower cervical, upper thoracic region subcutaneously.  Nontender to palpation.

## 2020-10-09 NOTE — HISTORY OF PRESENT ILLNESS
[Home] : at home, [unfilled] , at the time of the visit. [Verbal consent obtained from patient] : the patient, [unfilled] [Medical Office: ([facility])___] : at the medical office located in  [FreeTextEntry1] : Pt wants to discuss options regarding neck surgery. \par She has been experiencing chronic posterior neck pain surrounding the soft tissue mass containin.g subcutaneous fat located at the lower cervical upper thoracic region.  Pain is been chronic x greater than 1 year and interferes with daily life.  It is located at the base of the neck posteriorly, and radiates to the shoulders.  Patient suffers from intermittent cervicogenic headache as well.  He does also have cervical thoracic spondylosis noted on MRI from 10/2019.

## 2020-10-09 NOTE — HISTORY OF PRESENT ILLNESS
[Home] : at home, [unfilled] , at the time of the visit. [Medical Office: ([facility])___] : at the medical office located in  [Verbal consent obtained from patient] : the patient, [unfilled] [FreeTextEntry1] : Pt wants to discuss options regarding neck surgery. \par She has been experiencing chronic posterior neck pain surrounding the soft tissue mass containin.g subcutaneous fat located at the lower cervical upper thoracic region.  Pain is been chronic x greater than 1 year and interferes with daily life.  It is located at the base of the neck posteriorly, and radiates to the shoulders.  Patient suffers from intermittent cervicogenic headache as well.  He does also have cervical thoracic spondylosis noted on MRI from 10/2019.

## 2020-10-09 NOTE — PLAN
[FreeTextEntry1] : Patient has multiple musculoskeletal issues including chronic cervical spasm, bilateral shoulder pain with right shoulder tendinitis, and chronic upper thoracic pain.\par Recommend orthopedic consult.\par Encouraged to follow-up with GI for repeat screening colonoscopy.\par Left lower breast mass clinically appears to be a cyst.  Importance of ultrasound imaging discussed with patient.\par The pt was counseled on the risks and benefits of influenza vaccination. Side effects were reviewed with the patient, including risk for redness or soreness at the site of the injection, fever, aches, itching, headaches and fatigue.\par Follow-up after tests.

## 2020-10-09 NOTE — REVIEW OF SYSTEMS
[Joint Pain] : joint pain [Joint Stiffness] : joint stiffness [Muscle Pain] : muscle pain [Muscle Weakness] : no muscle weakness [Back Pain] : back pain [Negative] : Heme/Lymph [de-identified] : See HPI

## 2020-10-09 NOTE — HISTORY OF PRESENT ILLNESS
[FreeTextEntry1] : Patient presents in office today for CPE. \par c/o chronic neck pain. \par To have removal of fatty tissue accumulation on the lower cervical / upper thoracic region.\par He is wondering if he has a Cushings syndrome that is contributing to the fatty tissue accumulation on the back of the neck.\par c/o chronic b/l shoulder pain R>L x yrs. Pain worse with lying on either shoulder and associated with stiffness of movement R>L.\par Saw orthopedist in the past for his chronic left shoulder pain and received a cortisone shot to the left shoulder which helped.\par c/o chronic low back pain x years.\par c/o lump to the left breast area. \par He noted the lump recently and it feels like a cyst. Nontender.\par c/o small skin growth on the penis and on the forehead. He applied Tacrolimus oint and it resolves, but keeps recurring.\par He is due for refill of his allopurinol.\par \par \par  [de-identified] : He is exercising daily.\par Diet consists of increased fruits and vegetables.\par Patient had a colonic polypectomy in 2016 and follow-up was recommended in 3 years.

## 2020-10-09 NOTE — HEALTH RISK ASSESSMENT
[HIV Test offered] : HIV Test offered [Hepatitis C test offered] : Hepatitis C test offered [Fully functional (bathing, dressing, toileting, transferring, walking, feeding)] : Fully functional (bathing, dressing, toileting, transferring, walking, feeding) [Fully functional (using the telephone, shopping, preparing meals, housekeeping, doing laundry, using] : Fully functional and needs no help or supervision to perform IADLs (using the telephone, shopping, preparing meals, housekeeping, doing laundry, using transportation, managing medications and managing finances) [With Patient/Caregiver] : With Patient/Caregiver [Good] : ~his/her~  mood as  good [Yes] : Yes [2 - 4 times a month (2 pts)] : 2-4 times a month (2 points) [3 or 4 (1 pt)] : 3 or 4  (1 point) [Never (0 pts)] : Never (0 points) [No] : In the past 12 months have you used drugs other than those required for medical reasons? No [No falls in past year] : Patient reported no falls in the past year [0] : 2) Feeling down, depressed, or hopeless: Not at all (0) [Patient declined Low Dose CT Scan] : Patient declined Low Dose CT Scan [No Retinopathy] : No retinopathy [Designated Healthcare Proxy] : Designated healthcare proxy [Name: ___] : Health Care Proxy's Name: [unfilled]  [Relationship: ___] : Relationship: [unfilled] [FreeTextEntry1] : back pain [] : No [de-identified] : cardiology, podiatry, ENT, audiology, opthalmology. [Audit-CScore] : 3 [de-identified] : golfing, cardio, weightlifting [de-identified] : good, a lot of produce no mammal meat and cut back on sugar [PXP8Seyor] : 0 [LowDoseCTScan] : 10/2020 [EyeExamDate] : 01/2017 [MammogramDate] : 10/2017 [MammogramComments] : birads 2 [ColonoscopyDate] : 02/2016 [ColonoscopyComments] : polypectomy. Repeat in 3 years recommended. [HIVDate] : 10/2020 [HepatitisCDate] : 10/2020 [AdvancecareDate] : 10/2020

## 2020-10-09 NOTE — PLAN
[FreeTextEntry1] : Agree with patient seeking opinion for surgical removal of the soft tissue mass.\par He is to follow-up for his consultation.

## 2020-10-09 NOTE — END OF VISIT
[FreeTextEntry3] : Start Time: 10:16 am\par End Time: 10:22 am\par Non-face-to-face time includes chart review prior to initiation of visit, and post visit processing of orders, and prescriptions.\par \par  [FreeTextEntry2] : Start Time: 10:06 am\par End Time: 10:20 am\par Non-face-to-face time includes chart review prior to initiation of visit, and post visit processing of orders, and coordination of care.\par \par

## 2020-10-09 NOTE — ADDENDUM
[FreeTextEntry1] : At the conclusion of the visit, the pt stated that he recorded what I had said during the encounter so that he would not forget my recommendations. I told him that I do not mind if he would like to record my recommendations during an office visit, but that he must ask permission before doing so.

## 2020-10-13 ENCOUNTER — TRANSCRIPTION ENCOUNTER (OUTPATIENT)
Age: 65
End: 2020-10-13

## 2020-10-13 LAB
ALBUMIN SERPL ELPH-MCNC: 4.9 G/DL
ALP BLD-CCNC: 67 U/L
ALT SERPL-CCNC: 29 U/L
ANA SER IF-ACNC: NEGATIVE
ANION GAP SERPL CALC-SCNC: 13 MMOL/L
AST SERPL-CCNC: 30 U/L
BASOPHILS # BLD AUTO: 0.05 K/UL
BASOPHILS NFR BLD AUTO: 0.7 %
BILIRUB SERPL-MCNC: 0.9 MG/DL
BUN SERPL-MCNC: 12 MG/DL
CALCIUM SERPL-MCNC: 10.3 MG/DL
CHLORIDE SERPL-SCNC: 105 MMOL/L
CHOLEST SERPL-MCNC: 191 MG/DL
CHOLEST/HDLC SERPL: 3.1 RATIO
CO2 SERPL-SCNC: 24 MMOL/L
CORTIS SERPL-MCNC: 7.9 UG/DL
CREAT SERPL-MCNC: 0.79 MG/DL
CRP SERPL-MCNC: 0.28 MG/DL
ENA SS-A AB SER IA-ACNC: <0.2 AL
ENA SS-B AB SER IA-ACNC: <0.2 AL
EOSINOPHIL # BLD AUTO: 0.15 K/UL
EOSINOPHIL NFR BLD AUTO: 2.2 %
GLUCOSE SERPL-MCNC: 83 MG/DL
HCT VFR BLD CALC: 47.1 %
HDLC SERPL-MCNC: 62 MG/DL
HGB BLD-MCNC: 15.7 G/DL
IMM GRANULOCYTES NFR BLD AUTO: 0.4 %
LDLC SERPL CALC-MCNC: 109 MG/DL
LYMPHOCYTES # BLD AUTO: 1.75 K/UL
LYMPHOCYTES NFR BLD AUTO: 26.2 %
MAN DIFF?: NORMAL
MCHC RBC-ENTMCNC: 29.2 PG
MCHC RBC-ENTMCNC: 33.3 GM/DL
MCV RBC AUTO: 87.5 FL
MONOCYTES # BLD AUTO: 0.52 K/UL
MONOCYTES NFR BLD AUTO: 7.8 %
NEUTROPHILS # BLD AUTO: 4.19 K/UL
NEUTROPHILS NFR BLD AUTO: 62.7 %
PLATELET # BLD AUTO: 224 K/UL
POTASSIUM SERPL-SCNC: 4.7 MMOL/L
PROT SERPL-MCNC: 6.7 G/DL
RBC # BLD: 5.38 M/UL
RBC # FLD: 13.5 %
RHEUMATOID FACT SER QL: 12 IU/ML
SODIUM SERPL-SCNC: 142 MMOL/L
TRIGL SERPL-MCNC: 99 MG/DL
TSH SERPL-ACNC: 1.92 UIU/ML
URATE SERPL-MCNC: 5.1 MG/DL
WBC # FLD AUTO: 6.69 K/UL

## 2020-10-27 ENCOUNTER — RX RENEWAL (OUTPATIENT)
Age: 65
End: 2020-10-27

## 2020-11-11 ENCOUNTER — APPOINTMENT (OUTPATIENT)
Dept: FAMILY MEDICINE | Facility: CLINIC | Age: 65
End: 2020-11-11
Payer: COMMERCIAL

## 2020-11-11 VITALS
RESPIRATION RATE: 16 BRPM | SYSTOLIC BLOOD PRESSURE: 112 MMHG | BODY MASS INDEX: 28.51 KG/M2 | WEIGHT: 210.5 LBS | HEART RATE: 50 BPM | HEIGHT: 72 IN | TEMPERATURE: 97.4 F | DIASTOLIC BLOOD PRESSURE: 74 MMHG | OXYGEN SATURATION: 99 %

## 2020-11-11 DIAGNOSIS — Z87.39 PERSONAL HISTORY OF OTHER DISEASES OF THE MUSCULOSKELETAL SYSTEM AND CONNECTIVE TISSUE: ICD-10-CM

## 2020-11-11 DIAGNOSIS — M75.51 BURSITIS OF RIGHT SHOULDER: ICD-10-CM

## 2020-11-11 DIAGNOSIS — N63.0 UNSPECIFIED LUMP IN UNSPECIFIED BREAST: ICD-10-CM

## 2020-11-11 PROCEDURE — 99072 ADDL SUPL MATRL&STAF TM PHE: CPT

## 2020-11-11 PROCEDURE — 69210 REMOVE IMPACTED EAR WAX UNI: CPT

## 2020-11-11 PROCEDURE — 99214 OFFICE O/P EST MOD 30 MIN: CPT | Mod: 25

## 2020-11-12 PROBLEM — M75.51 SUBACROMIAL BURSITIS OF RIGHT SHOULDER JOINT: Status: ACTIVE | Noted: 2020-10-22

## 2020-11-12 NOTE — HISTORY OF PRESENT ILLNESS
[FreeTextEntry1] : Patient at office to follow up on right shoulder pain. Pt has right shoulder MRI completed 10/14/2020 on chart for review. \par Right shoulder is chronically painful.\par Patient states he saw orthopedic Dr Arndt, told pt he has arthritis of the shoulder and scoliosis of the lumbar spine.\par Patient c/o feeling of fluid in left ear x 2 weeks.\par Patient states he had the soft tissue mass removed from his upper thoracic region 2 weeks ago.  According to patient, procedure went smoothly without complication.\par He has a left breast cyst at the 6 o'clock position.  He was due for ultrasound but has not gone yet.  He states he has a lump on the right breast as well.

## 2020-11-12 NOTE — DATA REVIEWED
[FreeTextEntry1] : Orthopedic evaluation 10/27/2020 reviewed.\par \par MRI right shoulder 10/14/2020:\par Moderately advanced glenohumeral joint osteoarthritis with effusion and chronic synovitis and osteochondral loose body.\par Tendinosis and small shallow partial tear of supraspinatus tendon.  Mild tendinosis of infraspinatus and subscapularis tendons.  All tenosynovitis of the long head of the biceps tendon.\par Osseous acromial.  Mild acromioclavicular joint degenerative disease.  Mild subacromial subdeltoid bursitis.

## 2020-11-12 NOTE — PLAN
[FreeTextEntry1] : F/U after breast imaging complete.\par Pt requesting 2nd opinion from orthopedist re: chronic right shoulder pain.

## 2020-11-12 NOTE — REVIEW OF SYSTEMS
[Joint Pain] : joint pain [Joint Stiffness] : joint stiffness [Muscle Pain] : muscle pain [Back Pain] : back pain [Negative] : Psychiatric [Skin Rash] : no skin rash

## 2020-11-12 NOTE — PHYSICAL EXAM
[No Acute Distress] : no acute distress [Normal Sclera/Conjunctiva] : normal sclera/conjunctiva [PERRL] : pupils equal round and reactive to light [No Lymphadenopathy] : no lymphadenopathy [Supple] : supple [Clear to Auscultation] : lungs were clear to auscultation bilaterally [Normal Rate] : normal rate  [Regular Rhythm] : with a regular rhythm [Normal S1, S2] : normal S1 and S2 [No Murmur] : no murmur heard [No Edema] : there was no peripheral edema [Examination Of The Breasts] : a normal appearance [No Discharge] : no discharge [Nontender] : nontender [] : in the 9:00 position [___cm Radial Distance from the Nipple] : [unfilled] ~Ucm radially from the nipple [___cm] : a ~M [unfilled] ~Ucm inferior medial quadrant mass was palpated [Deep] : deep [Firm] : firm [Mobile] : mobile [Tender] : tender [6:00] : in the 6:00 position [Normal] : no posterior cervical lymphadenopathy and no anterior cervical lymphadenopathy [No Rash] : no rash [Axillary Lymph Nodes Enlarged Bilaterally] : no enlarged nodes [de-identified] : Bilateral cerumen impaction.  Cleared with irrigation.  Post clearance, TMs appear normal bilaterally. [de-identified] : Surgical scar upper thorax midline.  No discharge or erythema.

## 2020-12-15 ENCOUNTER — APPOINTMENT (OUTPATIENT)
Dept: DERMATOLOGY | Facility: CLINIC | Age: 65
End: 2020-12-15
Payer: MEDICARE

## 2020-12-15 PROCEDURE — 99214 OFFICE O/P EST MOD 30 MIN: CPT

## 2021-01-07 ENCOUNTER — TRANSCRIPTION ENCOUNTER (OUTPATIENT)
Age: 66
End: 2021-01-07

## 2021-01-09 ENCOUNTER — TRANSCRIPTION ENCOUNTER (OUTPATIENT)
Age: 66
End: 2021-01-09

## 2021-01-12 ENCOUNTER — APPOINTMENT (OUTPATIENT)
Dept: GASTROENTEROLOGY | Facility: CLINIC | Age: 66
End: 2021-01-12
Payer: MEDICARE

## 2021-01-12 VITALS
BODY MASS INDEX: 29.12 KG/M2 | SYSTOLIC BLOOD PRESSURE: 154 MMHG | OXYGEN SATURATION: 99 % | HEART RATE: 51 BPM | DIASTOLIC BLOOD PRESSURE: 89 MMHG | RESPIRATION RATE: 14 BRPM | WEIGHT: 215 LBS | TEMPERATURE: 96.4 F | HEIGHT: 72 IN

## 2021-01-12 PROCEDURE — 99202 OFFICE O/P NEW SF 15 MIN: CPT

## 2021-01-12 RX ORDER — METHYLPREDNISOLONE 4 MG/1
4 TABLET ORAL
Qty: 21 | Refills: 0 | Status: DISCONTINUED | COMMUNITY
Start: 2020-10-27 | End: 2021-01-12

## 2021-01-12 RX ORDER — SILDENAFIL 100 MG/1
100 TABLET, FILM COATED ORAL
Qty: 6 | Refills: 0 | Status: ACTIVE | COMMUNITY
Start: 2021-01-11

## 2021-01-12 NOTE — END OF VISIT
[FreeTextEntry3] : I was present for both a physical history and evaluation this patient which was discussed with the patient and Nataly Buck NP

## 2021-01-12 NOTE — PHYSICAL EXAM
[General Appearance - Alert] : alert [General Appearance - In No Acute Distress] : in no acute distress [Sclera] : the sclera and conjunctiva were normal [PERRL With Normal Accommodation] : pupils were equal in size, round, and reactive to light [Extraocular Movements] : extraocular movements were intact [Outer Ear] : the ears and nose were normal in appearance [Oropharynx] : the oropharynx was normal [Neck Appearance] : the appearance of the neck was normal [Jugular Venous Distention Increased] : there was no jugular-venous distention [Neck Cervical Mass (___cm)] : no neck mass was observed [Thyroid Diffuse Enlargement] : the thyroid was not enlarged [Thyroid Nodule] : there were no palpable thyroid nodules [Auscultation Breath Sounds / Voice Sounds] : lungs were clear to auscultation bilaterally [Heart Rate And Rhythm] : heart rate was normal and rhythm regular [Heart Sounds] : normal S1 and S2 [Murmurs] : no murmurs [Heart Sounds Gallop] : no gallops [Heart Sounds Pericardial Friction Rub] : no pericardial rub [Bowel Sounds] : normal bowel sounds [Abdomen Soft] : soft [Abdomen Tenderness] : non-tender [Abdomen Mass (___ Cm)] : no abdominal mass palpated [Normal Sphincter Tone] : normal sphincter tone [No Rectal Mass] : no rectal mass [Internal Hemorrhoid] : internal hemorrhoids [External Hemorrhoid] : external hemorrhoids [Abnormal Walk] : normal gait [Nail Clubbing] : no clubbing  or cyanosis of the fingernails [Musculoskeletal - Swelling] : no joint swelling seen [Motor Tone] : muscle strength and tone were normal [Skin Color & Pigmentation] : normal skin color and pigmentation [Skin Turgor] : normal skin turgor [] : no rash [Oriented To Time, Place, And Person] : oriented to person, place, and time [Impaired Insight] : insight and judgment were intact [Affect] : the affect was normal [Occult Blood Positive] : stool was negative for occult blood

## 2021-01-12 NOTE — ASSESSMENT
[FreeTextEntry1] : The patient presents today for colon cancer screening. His last colonosocpy was 5 years ago and he had a small hyperplastic polyp removed. No prior history of colon polyps and no family history of colon cancer or colon polyps. He is of average risk for developing colorectal neoplasm as hyperplastic polyps do not increase the risk of colon cancer. Therefore he does not need to repeat a colonosocpy for another 5 years (2026). However, if he should develop any symptoms such as abdominal pain, rectal bleeding, changes in bowel habits or anemia, or if he has a change in family history, then he will be seen in the office and scheduled for a colonoscopy at that time. He verbalized understanding.

## 2021-01-12 NOTE — HISTORY OF PRESENT ILLNESS
[Nausea] : denies nausea [Heartburn] : denies heartburn [Vomiting] : denies vomiting [Diarrhea] : denies diarrhea [Constipation] : denies constipation [Yellow Skin Or Eyes (Jaundice)] : denies jaundice [Abdominal Pain] : denies abdominal pain [Abdominal Swelling] : denies abdominal swelling [Rectal Pain] : denies rectal pain [_________] : Performed [unfilled] [de-identified] : RICHARD MAGNANI is a 65 year old male presenting today for colon cancer screening. His last colonoscopy was in 2016 and he had a 3 mm hyperplastic polyp removed. He has no family history of colon cancer or polyps. He feels well and offers no complaints. He denies any abdominal pain, bloating, constipation, diarrhea, black or bloody stools. Denies any upper GI symptoms. His appetite is good and weight is stable. He has no significant medical history other than gout. His BMI is 29.16. [de-identified] : 3 mm hyperplastic polyp

## 2021-01-12 NOTE — ADDENDUM
[FreeTextEntry1] : I, Nataly Buck NP, acted as scribe for Dr. Smooth Araiza for this patient encounter

## 2021-01-15 ENCOUNTER — TRANSCRIPTION ENCOUNTER (OUTPATIENT)
Age: 66
End: 2021-01-15

## 2021-01-18 ENCOUNTER — TRANSCRIPTION ENCOUNTER (OUTPATIENT)
Age: 66
End: 2021-01-18

## 2021-01-23 ENCOUNTER — TRANSCRIPTION ENCOUNTER (OUTPATIENT)
Age: 66
End: 2021-01-23

## 2021-01-30 ENCOUNTER — TRANSCRIPTION ENCOUNTER (OUTPATIENT)
Age: 66
End: 2021-01-30

## 2021-02-24 ENCOUNTER — TRANSCRIPTION ENCOUNTER (OUTPATIENT)
Age: 66
End: 2021-02-24

## 2021-02-25 ENCOUNTER — TRANSCRIPTION ENCOUNTER (OUTPATIENT)
Age: 66
End: 2021-02-25

## 2021-02-25 ENCOUNTER — RX RENEWAL (OUTPATIENT)
Age: 66
End: 2021-02-25

## 2021-03-01 ENCOUNTER — APPOINTMENT (OUTPATIENT)
Dept: FAMILY MEDICINE | Facility: CLINIC | Age: 66
End: 2021-03-01
Payer: MEDICARE

## 2021-03-01 DIAGNOSIS — Z92.29 PERSONAL HISTORY OF OTHER DRUG THERAPY: ICD-10-CM

## 2021-03-01 DIAGNOSIS — H61.23 IMPACTED CERUMEN, BILATERAL: ICD-10-CM

## 2021-03-01 DIAGNOSIS — L98.9 DISORDER OF THE SKIN AND SUBCUTANEOUS TISSUE, UNSPECIFIED: ICD-10-CM

## 2021-03-01 PROCEDURE — 99213 OFFICE O/P EST LOW 20 MIN: CPT | Mod: 95

## 2021-03-02 NOTE — PLAN
[FreeTextEntry1] : Continue current dose of allopurinol.\par Patient to seek second opinion by dermatology regarding skin changes noted.\par Surveillance of cholesterol and uric acid level recommended.  Blood work in 1 month.\par Follow-up 3 months.

## 2021-03-02 NOTE — END OF VISIT
[FreeTextEntry2] : Start Time: 2:00 pm\par End Time: 2:20 pm\par Non-face-to-face time includes chart review prior to initiation of visit, and post visit processing of orders, and prescriptions.\par \par \par

## 2021-03-02 NOTE — HISTORY OF PRESENT ILLNESS
[Home] : at home, [unfilled] , at the time of the visit. [Medical Office: ([facility])___] : at the medical office located in  [Verbal consent obtained from patient] : the patient, [unfilled] [FreeTextEntry1] : Patient is following up on gout.\par No recent acute episodes.\par Patient complains of spots on his skin which he was told by his dermatologist that they were related to his allopurinol.\par He would like to get a second dermatology opinion.

## 2021-03-04 ENCOUNTER — TRANSCRIPTION ENCOUNTER (OUTPATIENT)
Age: 66
End: 2021-03-04

## 2021-03-08 ENCOUNTER — TRANSCRIPTION ENCOUNTER (OUTPATIENT)
Age: 66
End: 2021-03-08

## 2021-03-30 ENCOUNTER — TRANSCRIPTION ENCOUNTER (OUTPATIENT)
Age: 66
End: 2021-03-30

## 2021-03-30 DIAGNOSIS — R53.82 CHRONIC FATIGUE, UNSPECIFIED: ICD-10-CM

## 2021-03-31 ENCOUNTER — TRANSCRIPTION ENCOUNTER (OUTPATIENT)
Age: 66
End: 2021-03-31

## 2021-04-02 ENCOUNTER — TRANSCRIPTION ENCOUNTER (OUTPATIENT)
Age: 66
End: 2021-04-02

## 2021-04-07 ENCOUNTER — TRANSCRIPTION ENCOUNTER (OUTPATIENT)
Age: 66
End: 2021-04-07

## 2021-04-08 ENCOUNTER — TRANSCRIPTION ENCOUNTER (OUTPATIENT)
Age: 66
End: 2021-04-08

## 2021-04-13 LAB
25(OH)D3 SERPL-MCNC: 28.6 NG/ML
ALBUMIN SERPL ELPH-MCNC: 4.6 G/DL
ALP BLD-CCNC: 76 U/L
ALT SERPL-CCNC: 21 U/L
ANION GAP SERPL CALC-SCNC: 12 MMOL/L
AST SERPL-CCNC: 30 U/L
BILIRUB SERPL-MCNC: 0.7 MG/DL
BUN SERPL-MCNC: 15 MG/DL
CALCIUM SERPL-MCNC: 10 MG/DL
CHLORIDE SERPL-SCNC: 104 MMOL/L
CHOLEST SERPL-MCNC: 159 MG/DL
CO2 SERPL-SCNC: 26 MMOL/L
CORTIS SERPL-MCNC: 15.3 UG/DL
CREAT SERPL-MCNC: 0.91 MG/DL
CRP SERPL-MCNC: <3 MG/L
GLUCOSE SERPL-MCNC: 93 MG/DL
HDLC SERPL-MCNC: 48 MG/DL
LDLC SERPL CALC-MCNC: 91 MG/DL
NONHDLC SERPL-MCNC: 111 MG/DL
POTASSIUM SERPL-SCNC: 4.8 MMOL/L
PROT SERPL-MCNC: 6.5 G/DL
SODIUM SERPL-SCNC: 141 MMOL/L
TRIGL SERPL-MCNC: 102 MG/DL
URATE SERPL-MCNC: 5.2 MG/DL
VIT B12 SERPL-MCNC: 319 PG/ML

## 2021-04-15 ENCOUNTER — TRANSCRIPTION ENCOUNTER (OUTPATIENT)
Age: 66
End: 2021-04-15

## 2021-04-16 ENCOUNTER — TRANSCRIPTION ENCOUNTER (OUTPATIENT)
Age: 66
End: 2021-04-16

## 2021-05-21 ENCOUNTER — APPOINTMENT (OUTPATIENT)
Dept: FAMILY MEDICINE | Facility: CLINIC | Age: 66
End: 2021-05-21
Payer: MEDICARE

## 2021-05-21 VITALS
OXYGEN SATURATION: 97 % | DIASTOLIC BLOOD PRESSURE: 70 MMHG | BODY MASS INDEX: 28.04 KG/M2 | HEART RATE: 52 BPM | SYSTOLIC BLOOD PRESSURE: 130 MMHG | WEIGHT: 207 LBS | HEIGHT: 72 IN

## 2021-05-21 DIAGNOSIS — M79.89 OTHER SPECIFIED SOFT TISSUE DISORDERS: ICD-10-CM

## 2021-05-21 DIAGNOSIS — R51.9 HEADACHE, UNSPECIFIED: ICD-10-CM

## 2021-05-21 PROCEDURE — 99214 OFFICE O/P EST MOD 30 MIN: CPT

## 2021-05-22 PROBLEM — M79.89 SOFT TISSUE MASS: Status: RESOLVED | Noted: 2019-10-04 | Resolved: 2021-05-22

## 2021-05-22 PROBLEM — R51.9 INTRACTABLE HEADACHE, UNSPECIFIED CHRONICITY PATTERN, UNSPECIFIED HEADACHE TYPE: Status: RESOLVED | Noted: 2019-07-29 | Resolved: 2021-05-22

## 2021-05-22 NOTE — PHYSICAL EXAM
[No Acute Distress] : no acute distress [No Lymphadenopathy] : no lymphadenopathy [No Respiratory Distress] : no respiratory distress  [Clear to Auscultation] : lungs were clear to auscultation bilaterally [No Edema] : there was no peripheral edema [Normal] : Normal [Pain] : was painful [Restricted] : was not restricted [FreeTextEntry3] : Lt rotation 65 deg   Rt rotation 60 deg

## 2021-05-22 NOTE — HISTORY OF PRESENT ILLNESS
[FreeTextEntry1] : Patient c/o chronic neck pain, worse x 2 months.\par Pain is aching, constant, located at right cervicothoracic junction adjacent to the spine. Pain radiates up the back of the neck on the right and is associated with headaches.\par The pain has been present for years. He has done PT and pain management in the past with inadequate relief.\par He had a lipoma removed from the upper thoracic midline in Oct 2020 and was hoping it might help with the chronic neck pain, but, it had no effect.\par Also, pt had Dermatolgy evaluation due to patch of skin discoloration in genital region. The pt is concerned that the skin discoloration is due to Allopurinol. The skin discoloration resolved with Clobetasol cream but recurred 1-2 wks after discontinuation.\par Pt would also like to review recent lab results.

## 2021-05-22 NOTE — PLAN
[FreeTextEntry1] : Pt encounter incorporated clinical review of the medical record including review of lab and diagnostic testing with interpretation and discussion of results with patient, general pt counseling, and coordination of care, as well as documentation update within the electronic medical record.\par \par Time spent: 35 mins.\par

## 2021-05-22 NOTE — ASSESSMENT
[FreeTextEntry1] : Nonpruritic skin rash occurs in < 2% of pts taking Allopurinol. It is unclear whether his skin discoloration is related to the Allopurinol. According to pt, the dermatologist was unsure about causal relationship of this medication to the rash as well. My recommendation is to minimize Allopurinol dosing and decrease to 200 mg / day, from 200 mg/d alternating with 300 mg/d and f/u with dermatologist. Check uric acid level in 1 - 2 months and monitor for breakthrough gout symptoms. Uric acid level currently at 5.2.\par Cholesterol is optimal without medications. Pt is excellent with diet / low cholesterol intake.\par He also exercises daily.\par Vitamin D is mildly low and supplementation was recently started.\par Recommend PM&R consult for tx of chronic neck pain.\par F/U after uric acid repeat.\par

## 2021-05-22 NOTE — REVIEW OF SYSTEMS
[Skin Rash] : skin rash [Headache] : headache [Negative] : Psychiatric [FreeTextEntry9] : Neck pain. Occ arthalgias hands, feet

## 2021-06-22 ENCOUNTER — TRANSCRIPTION ENCOUNTER (OUTPATIENT)
Age: 66
End: 2021-06-22

## 2021-06-23 ENCOUNTER — TRANSCRIPTION ENCOUNTER (OUTPATIENT)
Age: 66
End: 2021-06-23

## 2021-06-30 ENCOUNTER — APPOINTMENT (OUTPATIENT)
Dept: PHYSICAL MEDICINE AND REHAB | Facility: CLINIC | Age: 66
End: 2021-06-30
Payer: MEDICARE

## 2021-06-30 VITALS
DIASTOLIC BLOOD PRESSURE: 79 MMHG | TEMPERATURE: 96 F | BODY MASS INDEX: 27.9 KG/M2 | WEIGHT: 206 LBS | SYSTOLIC BLOOD PRESSURE: 139 MMHG | HEART RATE: 53 BPM | HEIGHT: 72 IN | RESPIRATION RATE: 14 BRPM

## 2021-06-30 DIAGNOSIS — Z78.9 OTHER SPECIFIED HEALTH STATUS: ICD-10-CM

## 2021-06-30 PROCEDURE — 20552 NJX 1/MLT TRIGGER POINT 1/2: CPT

## 2021-06-30 PROCEDURE — 99214 OFFICE O/P EST MOD 30 MIN: CPT | Mod: 25

## 2021-06-30 RX ORDER — TACROLIMUS 1 MG/G
0.1 OINTMENT TOPICAL
Qty: 60 | Refills: 0 | Status: DISCONTINUED | COMMUNITY
Start: 2020-12-15 | End: 2021-06-30

## 2021-06-30 RX ORDER — ALLOPURINOL 300 MG/1
300 TABLET ORAL
Qty: 45 | Refills: 2 | Status: DISCONTINUED | COMMUNITY
Start: 2021-03-08 | End: 2021-06-30

## 2021-06-30 NOTE — PHYSICAL EXAM
[FreeTextEntry1] : PE:\par Constitutional: In NAD, calm and cooperative\par HEENT: NCAT, Anicteric sclera, no lid-lag\par Cardio: Extremities appear pink and well perfused, no peripheral edema\par Respiratory: Normal respiratory effort on room air, no accessory muscle use\par Skin: no rashes seen on exposed skin, no visible abrasions\par Psych: Normal affect, intact judgment and insight\par Neuro: Awake, alert and oriented x 3, see below for focused neurological exam\par MSK (Neck):\par 	Inspection: no gross swelling identified\par 	Palpation: Tenderness of the right upper thoracic paraspinals with trigger points identified, no TTP of cervical paraspinals or temporal region of head at this time\par 	ROM: Pain at end cervical extension/flexion and lateral rotation\par 	Strength: 5/5 strength in bilateral upper extremities\par 	Reflexes: Symmetrical reflexes in upper and lower extremities, Cherry’s negative bilaterally\par 	Sensation: Intact to light touch in bilateral upper extremities\par 	Special tests: Modified. Spurling’s test negative bilaterally

## 2021-06-30 NOTE — ASSESSMENT
[FreeTextEntry1] : Mr. RICHARD MAGNANI is a 65 year old male who presents with chronic neck/upper back pain, likely myofascial in nature but does have a headache component with the pain spreading to the right side of his forehead. Denies any red flag signs. Will recommend:\par - TPI done today, tolerated well. \par - Will start PT 2-3x/week for stretching, strengthening, ROM exercises, HEP and modalities PRN including myofascial release, moist heat, and TENS therapy \par - Will refer to my colleague Dr. Echeverria of Headache/Pain Management for his opinion. \par \par RTC in 6 weeks. Patient in agreement with plan.

## 2021-06-30 NOTE — PROCEDURE
[de-identified] : Reason for procedure: Myositis\par \par Procedure: Trigger Point Injections\par Physician: Dr. Zavala\par Medication injected: Lidocaine 1%, 2cc total\par Sedation medications: None\par Estimated blood loss: None\par Complications: None\par \par Technique: R/B/A to trigger point injection reviewed with patient. The patient is agreeable and wishes to proceed.   The patient was placed in the seated position and 2 trigger points right thoracic paraspinals were identified. The area was prepped in normal sterile fashion with Chloroprep. A 27 gauge 1.25 inch needle was advanced into the palpable trigger points with reproduction of pain. After negative aspiration of heme, the above medications were injected into the trigger areas. Needle was then removed, bandaid placed over injection sites. There was no complications, the patient was provided with post injection instructions.

## 2021-06-30 NOTE — HISTORY OF PRESENT ILLNESS
[FreeTextEntry1] : Mr. RICHARD MAGNANI  is a 65 year old male who presents with neck pain/headaches. \par \par Location:Lower cervical spine/upper thoracic spine, more on the right\par Onset:Chronic, but worsening over last 3 months\par Provocation/Palliative:Not sure if it gets worse with lifting weights (does it 2x/week), better with heat\par Quality:throbbing, achy\par Radiation:Can radiate to right side of neck as well as R side of temporal bone\par Severity:mild at this time\par Timing:Come and goes, associated with headaches as well\par \par Denies any associated numbness. Denies any associated arm or hand weakness. Denies any loss of bowel/bladder control or any groin numbness.\par Previous medications trialed:Naproxen but tries to avoid medications\par Previous procedures relevant to complaint:Did have lipoma removed in 10/2020 hoping to relieve pain but it did not help. \par Has tried conservative treatment?:Has not done PT\par

## 2021-07-06 ENCOUNTER — TRANSCRIPTION ENCOUNTER (OUTPATIENT)
Age: 66
End: 2021-07-06

## 2021-07-06 LAB
ANION GAP SERPL CALC-SCNC: 12 MMOL/L
BUN SERPL-MCNC: 13 MG/DL
CALCIUM SERPL-MCNC: 9.8 MG/DL
CHLORIDE SERPL-SCNC: 105 MMOL/L
CO2 SERPL-SCNC: 23 MMOL/L
CREAT SERPL-MCNC: 0.9 MG/DL
GLUCOSE SERPL-MCNC: 88 MG/DL
POTASSIUM SERPL-SCNC: 4.6 MMOL/L
SODIUM SERPL-SCNC: 140 MMOL/L
URATE SERPL-MCNC: 5.4 MG/DL

## 2021-07-12 ENCOUNTER — TRANSCRIPTION ENCOUNTER (OUTPATIENT)
Age: 66
End: 2021-07-12

## 2021-08-11 ENCOUNTER — APPOINTMENT (OUTPATIENT)
Dept: PHYSICAL MEDICINE AND REHAB | Facility: CLINIC | Age: 66
End: 2021-08-11

## 2021-08-23 ENCOUNTER — RX CHANGE (OUTPATIENT)
Age: 66
End: 2021-08-23

## 2021-08-25 ENCOUNTER — TRANSCRIPTION ENCOUNTER (OUTPATIENT)
Age: 66
End: 2021-08-25

## 2021-08-26 ENCOUNTER — RX CHANGE (OUTPATIENT)
Age: 66
End: 2021-08-26

## 2021-10-01 ENCOUNTER — TRANSCRIPTION ENCOUNTER (OUTPATIENT)
Age: 66
End: 2021-10-01

## 2021-10-11 ENCOUNTER — RX RENEWAL (OUTPATIENT)
Age: 66
End: 2021-10-11

## 2021-10-11 LAB
ALBUMIN SERPL ELPH-MCNC: 4.7 G/DL
ALP BLD-CCNC: 71 U/L
ALT SERPL-CCNC: 28 U/L
ANION GAP SERPL CALC-SCNC: 15 MMOL/L
AST SERPL-CCNC: 35 U/L
BASOPHILS # BLD AUTO: 0.06 K/UL
BASOPHILS NFR BLD AUTO: 0.7 %
BILIRUB SERPL-MCNC: 0.9 MG/DL
BUN SERPL-MCNC: 13 MG/DL
CALCIUM SERPL-MCNC: 9.5 MG/DL
CHLORIDE SERPL-SCNC: 101 MMOL/L
CHOLEST SERPL-MCNC: 168 MG/DL
CO2 SERPL-SCNC: 24 MMOL/L
CREAT SERPL-MCNC: 0.89 MG/DL
EOSINOPHIL # BLD AUTO: 0.14 K/UL
EOSINOPHIL NFR BLD AUTO: 1.7 %
ESTIMATED AVERAGE GLUCOSE: 105 MG/DL
GLUCOSE SERPL-MCNC: 52 MG/DL
HBA1C MFR BLD HPLC: 5.3 %
HCT VFR BLD CALC: 44.4 %
HDLC SERPL-MCNC: 61 MG/DL
HGB BLD-MCNC: 15.1 G/DL
IMM GRANULOCYTES NFR BLD AUTO: 0.2 %
LDLC SERPL CALC-MCNC: 94 MG/DL
LYMPHOCYTES # BLD AUTO: 1.99 K/UL
LYMPHOCYTES NFR BLD AUTO: 24.7 %
MAN DIFF?: NORMAL
MCHC RBC-ENTMCNC: 29.5 PG
MCHC RBC-ENTMCNC: 34 GM/DL
MCV RBC AUTO: 86.9 FL
MONOCYTES # BLD AUTO: 0.56 K/UL
MONOCYTES NFR BLD AUTO: 6.9 %
NEUTROPHILS # BLD AUTO: 5.3 K/UL
NEUTROPHILS NFR BLD AUTO: 65.8 %
NONHDLC SERPL-MCNC: 107 MG/DL
PLATELET # BLD AUTO: 227 K/UL
POTASSIUM SERPL-SCNC: 4.7 MMOL/L
PROT SERPL-MCNC: 6.5 G/DL
PSA SERPL-MCNC: 1.4 NG/ML
RBC # BLD: 5.11 M/UL
RBC # FLD: 13.3 %
SODIUM SERPL-SCNC: 140 MMOL/L
TRIGL SERPL-MCNC: 62 MG/DL
TSH SERPL-ACNC: 2.13 UIU/ML
URATE SERPL-MCNC: 6.4 MG/DL
WBC # FLD AUTO: 8.07 K/UL

## 2021-10-12 ENCOUNTER — TRANSCRIPTION ENCOUNTER (OUTPATIENT)
Age: 66
End: 2021-10-12

## 2021-10-13 ENCOUNTER — RX RENEWAL (OUTPATIENT)
Age: 66
End: 2021-10-13

## 2021-10-13 ENCOUNTER — APPOINTMENT (OUTPATIENT)
Dept: FAMILY MEDICINE | Facility: CLINIC | Age: 66
End: 2021-10-13
Payer: MEDICARE

## 2021-10-13 VITALS
OXYGEN SATURATION: 99 % | WEIGHT: 200 LBS | SYSTOLIC BLOOD PRESSURE: 102 MMHG | BODY MASS INDEX: 27.09 KG/M2 | TEMPERATURE: 97.7 F | DIASTOLIC BLOOD PRESSURE: 68 MMHG | HEART RATE: 51 BPM | HEIGHT: 72 IN

## 2021-10-13 PROCEDURE — 90732 PPSV23 VACC 2 YRS+ SUBQ/IM: CPT

## 2021-10-13 PROCEDURE — G0008: CPT

## 2021-10-13 PROCEDURE — G0438: CPT

## 2021-10-13 PROCEDURE — G0009: CPT

## 2021-10-13 PROCEDURE — 90662 IIV NO PRSV INCREASED AG IM: CPT

## 2021-10-13 PROCEDURE — G0402 INITIAL PREVENTIVE EXAM: CPT

## 2021-10-13 NOTE — PHYSICAL EXAM
[No Acute Distress] : no acute distress [Normal Sclera/Conjunctiva] : normal sclera/conjunctiva [PERRL] : pupils equal round and reactive to light [No Lymphadenopathy] : no lymphadenopathy [Supple] : supple [No Respiratory Distress] : no respiratory distress  [Clear to Auscultation] : lungs were clear to auscultation bilaterally [No Edema] : there was no peripheral edema [Normal Appearance] : normal in appearance [No Masses] : no palpable masses [No Nipple Discharge] : no nipple discharge [No Axillary Lymphadenopathy] : no axillary lymphadenopathy [No Focal Deficits] : no focal deficits [Normal] : affect was normal and insight and judgment were intact

## 2021-10-14 ENCOUNTER — TRANSCRIPTION ENCOUNTER (OUTPATIENT)
Age: 66
End: 2021-10-14

## 2021-11-16 ENCOUNTER — RX RENEWAL (OUTPATIENT)
Age: 66
End: 2021-11-16

## 2021-11-17 ENCOUNTER — TRANSCRIPTION ENCOUNTER (OUTPATIENT)
Age: 66
End: 2021-11-17

## 2021-11-17 LAB
C TRACH RRNA SPEC QL NAA+PROBE: NOT DETECTED
CRP SERPL HS-MCNC: 1.66 MG/L
HBV SURFACE AB SER QL: NONREACTIVE
HBV SURFACE AG SER QL: NONREACTIVE
HCV AB SER QL: NONREACTIVE
HCV S/CO RATIO: 0.12 S/CO
N GONORRHOEA RRNA SPEC QL NAA+PROBE: NOT DETECTED
SOURCE AMPLIFICATION: NORMAL
T PALLIDUM AB SER QL IA: NEGATIVE

## 2021-11-18 ENCOUNTER — RX RENEWAL (OUTPATIENT)
Age: 66
End: 2021-11-18

## 2021-11-22 ENCOUNTER — TRANSCRIPTION ENCOUNTER (OUTPATIENT)
Age: 66
End: 2021-11-22

## 2021-11-23 ENCOUNTER — TRANSCRIPTION ENCOUNTER (OUTPATIENT)
Age: 66
End: 2021-11-23

## 2021-12-15 ENCOUNTER — APPOINTMENT (OUTPATIENT)
Dept: DERMATOLOGY | Facility: CLINIC | Age: 66
End: 2021-12-15
Payer: MEDICARE

## 2021-12-15 PROCEDURE — 99214 OFFICE O/P EST MOD 30 MIN: CPT | Mod: 25

## 2021-12-15 PROCEDURE — 17110 DESTRUCTION B9 LES UP TO 14: CPT

## 2022-02-08 ENCOUNTER — TRANSCRIPTION ENCOUNTER (OUTPATIENT)
Age: 67
End: 2022-02-08

## 2022-02-09 ENCOUNTER — TRANSCRIPTION ENCOUNTER (OUTPATIENT)
Age: 67
End: 2022-02-09

## 2022-02-10 ENCOUNTER — APPOINTMENT (OUTPATIENT)
Dept: ORTHOPEDIC SURGERY | Facility: CLINIC | Age: 67
End: 2022-02-10
Payer: MEDICARE

## 2022-02-10 VITALS
HEART RATE: 56 BPM | SYSTOLIC BLOOD PRESSURE: 150 MMHG | WEIGHT: 200 LBS | DIASTOLIC BLOOD PRESSURE: 80 MMHG | HEIGHT: 72 IN | BODY MASS INDEX: 27.09 KG/M2

## 2022-02-10 PROCEDURE — 72100 X-RAY EXAM L-S SPINE 2/3 VWS: CPT

## 2022-02-10 PROCEDURE — 99203 OFFICE O/P NEW LOW 30 MIN: CPT

## 2022-02-10 NOTE — HISTORY OF PRESENT ILLNESS
[de-identified] : 66 year old M presents for an initial evaluation of lower back pain, he states he was recently shoveling snow and after the fact pain came on slowly. He flew down to [place] to play golf after this and notes this made pain significantly worse. He has undergone injection therapy for cervical pain previously. Patient remains active, he states he deadlifts and does core workouts which he thinks is maintaining his baseline.  [Ataxia] : no ataxia [Incontinence] : no incontinence [Loss of Dexterity] : good dexterity [Urinary Ret.] : no urinary retention

## 2022-02-10 NOTE — PHYSICAL EXAM
[Poor Appearance] : well-appearing [Acute Distress] : not in acute distress [Obese] : not obese [de-identified] : CONSTITUTIONAL: The patient is a very pleasant individual who is well-nourished and who appears stated age.\par PSYCHIATRIC: The patient is alert and oriented X 3 and in no apparent distress, and participates with orthopedic evaluation well.\par HEAD: Atraumatic and is nonsyndromic in appearance.\par EENT: No visible thyromegaly, EOMI.\par RESPIRATORY: Respiratory rate is regular, not dyspneic on examination.\par LYMPHATICS: There is no inguinal lymphadenopathy\par INTEGUMENTARY: Skin is clean, dry, and intact about the bilateral lower extremities and lumbar spine.\par VASCULAR: There is brisk capillary refill about the bilateral lower extremities.\par NEUROLOGIC: There are no pathologic reflexes. There is no decrease in sensation of the bilateral lower extremities on Wartenberg pinwheel examination. Deep tendon reflexes are well maintained at 2+/4 of the bilateral lower extremities and are symmetric.\par MUSCULOSKELETAL: There is no visible muscular atrophy. Manual motor strength is well maintained in the bilateral lower extremities. Negative tension sign and straight leg raise bilaterally. Quad extension, ankle dorsiflexion, EHL, plantar flexion, and ankle eversion are well preserved. Normal secondary orthopaedic exam of bilateral hips, greater trochanteric area, knees and ankles. No pain with internal or external rotation of the bilateral hips. Mechanical oriented cervical and lumbar spine pain, Consistent with degenerative disc disease  [de-identified] : Xray of the lumbar spine taken 02/10/2022 demonstrates multiple levels of lumbar degenerative disc disease

## 2022-02-10 NOTE — DISCUSSION/SUMMARY
[de-identified] : We talked about the nature of the condition and treatment options. Anticipatory guidance regarding mechanically oriented lower back pain was given.\par Patient has been referred to physical therapy for decreased pain modalities, stretching and strengthening modalities, soft tissue modalities, and physical modalities. We discussed modification of gym work outs and ceasing heavy dead lift type exercises. I advised light aerobic conditioning and light weight bearing exercises. Patient was provided a Rx for Naproxen Sodium 500Mg BID. I will provide a prescription for Medrol dose pack for pain relief, patient was educated on the antiinflammatory properties of this medication and how this will help their pain. Patient has been referred to physiatry for assessment regarding their current pain level and possible injection therapy. \par \par Prior to appointment and during encounter with patient extensive medical records were reviewed including but not limited to, hospital records, out patient records, imaging results, and lab data. During this appointment the patient was examined, diagnoses were discussed and explained in a face to face manner. In addition extensive time was spent reviewing aforementioned diagnostic studies. Counseling including abnormal image results, differential diagnoses, treatment options, risk and benefits, lifestyle changes, current condition, and current medications was performed. Patient's comments, questions, and concerns were address and patient verbalized understanding. Based on this patient's presentation at our office, which is an orthopedic spine surgeon's office, this patient inherently / intrinsically has a risk, however minute, of developing  issues such as Cauda equina syndrome, bowel and bladder changes, or progression of motor or neurological deficits such as paralysis which may be permanent.

## 2022-02-10 NOTE — ADDENDUM
[FreeTextEntry1] : Documented by Cody Medeiros acting as a scribe for Dr. Jack Mazariegos on 02/10/2022. All medical record entries made by the Scribe were at my, Dr. Jack Mazariegos, direction and personally dictated by me on 02/10/2022 . I have reviewed the chart and agree that the record accurately reflects my personal performance of the history, physical exam, assessment and plan. I have also personally directed, reviewed, and agreed with the chart.

## 2022-02-17 ENCOUNTER — TRANSCRIPTION ENCOUNTER (OUTPATIENT)
Age: 67
End: 2022-02-17

## 2022-02-23 ENCOUNTER — APPOINTMENT (OUTPATIENT)
Dept: FAMILY MEDICINE | Facility: CLINIC | Age: 67
End: 2022-02-23
Payer: MEDICARE

## 2022-02-23 VITALS
HEIGHT: 72 IN | WEIGHT: 208 LBS | OXYGEN SATURATION: 98 % | HEART RATE: 67 BPM | BODY MASS INDEX: 28.17 KG/M2 | TEMPERATURE: 97.6 F | SYSTOLIC BLOOD PRESSURE: 112 MMHG | DIASTOLIC BLOOD PRESSURE: 76 MMHG

## 2022-02-23 DIAGNOSIS — G44.86 CERVICOGENIC HEADACHE: ICD-10-CM

## 2022-02-23 DIAGNOSIS — S29.019A STRAIN OF MUSCLE AND TENDON OF UNSPECIFIED WALL OF THORAX, INITIAL ENCOUNTER: ICD-10-CM

## 2022-02-23 PROCEDURE — 99214 OFFICE O/P EST MOD 30 MIN: CPT | Mod: 25

## 2022-02-23 PROCEDURE — 36415 COLL VENOUS BLD VENIPUNCTURE: CPT

## 2022-02-23 NOTE — REVIEW OF SYSTEMS
[Skin Rash] : no skin rash [Headache] : headache [Dizziness] : no dizziness [Negative] : Psychiatric

## 2022-02-23 NOTE — HISTORY OF PRESENT ILLNESS
[FreeTextEntry1] : Pt c/o chronic severe headaches, worse x 1 month.\par Headache pain originates at the right occipital region and radiates up from the right side of the head and to the right frontal region.\par Headache is pounding in nature, intermittent, 9/10 in severity. He denies any aura. There is no light or sound sensitivity.\par Headache is noted to be worse with right arm up over the head as well as after doing pull ups.\par He denies any weakness of the RUE.\par Some relief with heat application to the posterior right side of the neck. Also some relief with NSAID use, Ibuprofen 200 to 400 mg one-time dose. The headaches do wake him from sleep. He denies any associated nausea or vomiting. The patient has known cervical spondylosis and has had a history of occipital and right-sided headaches for many years.\par

## 2022-02-23 NOTE — ASSESSMENT
[FreeTextEntry1] : Patient with chronic intermittent migraine headache, exacerbated by cervical strain, now worse over the past month and awakens patient from sleep which is new. Neuro exam is normal.\par Recommend MRI of the head with contrast.\par Consider MRA pending results.\par Recommend warm compresses to the back of the neck and ibuprofen as needed.\par Recommend physical therapy and consideration for acupuncture.\par R/o metabolic disturbance as cause for headache exacerbation.\par Labs drawn in office.\par F/U after MRI head.\par To ER if severe and unremitting headache.\par

## 2022-02-23 NOTE — PHYSICAL EXAM
[No Acute Distress] : no acute distress [Normal Sclera/Conjunctiva] : normal sclera/conjunctiva [PERRL] : pupils equal round and reactive to light [EOMI] : extraocular movements intact [No Lymphadenopathy] : no lymphadenopathy [Supple] : supple [No Respiratory Distress] : no respiratory distress  [Clear to Auscultation] : lungs were clear to auscultation bilaterally [Normal Rate] : normal rate  [Regular Rhythm] : with a regular rhythm [Normal S1, S2] : normal S1 and S2 [No Murmur] : no murmur heard [No Carotid Bruits] : no carotid bruits [No Edema] : there was no peripheral edema [Cranial Nerves Optic (II)] : visual acuity and visual fields were intact [Cranial Nerves Oculomotor (III)] : the extraocular motions were intact [Cranial Nerves Trigeminal (V)] : sensation to the face and masseter strength were intact [Cranial Nerves Facial (VII)] : facial strength was intact bilaterally [Cranial Nerves Vestibulocochlear (VIII)] : hearing was intact [Cranial Nerves Glossopharyngeal (IX)] : there was normal movement of the soft palate and normal gag [Cranial Nerves Accessory (XI - Cranial And Spinal)] : shoulder shrug was intact bilaterally [Cranial Nerves Hypoglossal (XII)] : there was no tongue deviation with protrusion [Limited Balance] : balance was intact [Romberg's Sign] : Romberg's sign was negtive [Past-pointing] : there was no past-pointing [Dysdiadochokinesia Bilaterally] : not present [Coordination - Dysmetria Impaired Finger-to-Nose Bilateral] : not present [Coordination - Dysmetria Impaired Heel-to-Shin Bilateral] : not present [1+] : left 1+ [0] : left 0 [Plantar Reflex Right Only] : absent on the right [Plantar Reflex Left Only] : absent on the left [Normal] : affect was normal and insight and judgment were intact [de-identified] : Creased paracervical tone right greater than left, extending to the trapezius b/l. [de-identified] : Motor strength 5/5 upper and lower extremities, and equal bilaterally.

## 2022-02-28 ENCOUNTER — TRANSCRIPTION ENCOUNTER (OUTPATIENT)
Age: 67
End: 2022-02-28

## 2022-03-01 LAB
25(OH)D3 SERPL-MCNC: 32 NG/ML
ALBUMIN SERPL ELPH-MCNC: 4.9 G/DL
ALP BLD-CCNC: 75 U/L
ALT SERPL-CCNC: 28 U/L
ANION GAP SERPL CALC-SCNC: 11 MMOL/L
AST SERPL-CCNC: 26 U/L
BASOPHILS # BLD AUTO: 0.07 K/UL
BASOPHILS NFR BLD AUTO: 1 %
BILIRUB SERPL-MCNC: 0.8 MG/DL
BUN SERPL-MCNC: 20 MG/DL
CALCIUM SERPL-MCNC: 10.2 MG/DL
CHLORIDE SERPL-SCNC: 104 MMOL/L
CO2 SERPL-SCNC: 27 MMOL/L
CREAT SERPL-MCNC: 0.89 MG/DL
EOSINOPHIL # BLD AUTO: 0.26 K/UL
EOSINOPHIL NFR BLD AUTO: 3.8 %
ESTIMATED AVERAGE GLUCOSE: 111 MG/DL
GLUCOSE SERPL-MCNC: 78 MG/DL
HBA1C MFR BLD HPLC: 5.5 %
HCT VFR BLD CALC: 51 %
HGB BLD-MCNC: 16.7 G/DL
IMM GRANULOCYTES NFR BLD AUTO: 0.6 %
LYMPHOCYTES # BLD AUTO: 1.78 K/UL
LYMPHOCYTES NFR BLD AUTO: 25.9 %
MAN DIFF?: NORMAL
MCHC RBC-ENTMCNC: 28.4 PG
MCHC RBC-ENTMCNC: 32.7 GM/DL
MCV RBC AUTO: 86.9 FL
MONOCYTES # BLD AUTO: 0.58 K/UL
MONOCYTES NFR BLD AUTO: 8.5 %
NEUTROPHILS # BLD AUTO: 4.13 K/UL
NEUTROPHILS NFR BLD AUTO: 60.2 %
PLATELET # BLD AUTO: 222 K/UL
POTASSIUM SERPL-SCNC: 4.7 MMOL/L
PROT SERPL-MCNC: 7 G/DL
RBC # BLD: 5.87 M/UL
RBC # FLD: 13 %
SODIUM SERPL-SCNC: 142 MMOL/L
TSH SERPL-ACNC: 3.12 UIU/ML
VIT B12 SERPL-MCNC: 362 PG/ML
WBC # FLD AUTO: 6.86 K/UL

## 2022-05-02 ENCOUNTER — TRANSCRIPTION ENCOUNTER (OUTPATIENT)
Age: 67
End: 2022-05-02

## 2022-05-03 ENCOUNTER — TRANSCRIPTION ENCOUNTER (OUTPATIENT)
Age: 67
End: 2022-05-03

## 2022-05-09 ENCOUNTER — NON-APPOINTMENT (OUTPATIENT)
Age: 67
End: 2022-05-09

## 2022-05-09 ENCOUNTER — TRANSCRIPTION ENCOUNTER (OUTPATIENT)
Age: 67
End: 2022-05-09

## 2022-05-10 ENCOUNTER — APPOINTMENT (OUTPATIENT)
Dept: ORTHOPEDIC SURGERY | Facility: CLINIC | Age: 67
End: 2022-05-10
Payer: MEDICARE

## 2022-05-10 VITALS
BODY MASS INDEX: 27.77 KG/M2 | HEIGHT: 72 IN | HEART RATE: 56 BPM | SYSTOLIC BLOOD PRESSURE: 135 MMHG | DIASTOLIC BLOOD PRESSURE: 78 MMHG | WEIGHT: 205 LBS

## 2022-05-10 PROCEDURE — 99213 OFFICE O/P EST LOW 20 MIN: CPT

## 2022-05-10 NOTE — CONSULT LETTER
[Dear  ___] : Dear  [unfilled], [Consult Letter:] : I had the pleasure of evaluating your patient, [unfilled]. [( Thank you for referring [unfilled] for consultation for _____ )] : Thank you for referring [unfilled] for consultation for [unfilled] [Please see my note below.] : Please see my note below. [Consult Closing:] : Thank you very much for allowing me to participate in the care of this patient.  If you have any questions, please do not hesitate to contact me. [Sincerely,] : Sincerely, [FreeTextEntry2] : MARSHAL NASH\par  [FreeTextEntry3] : Tarik Levine DO.\par Sports Medicine \par Orthopaedic Surgery\par \par [facility] [place]\par [facility] \par [address] \par [address] \[place] [address]\par \par Tel [phone]\par Fax [phone]\par \par For same day and next day orthopedic appointments contact:\par [email or web address] |[phone])\par Appointments available nights and weekends!  \par \par [facility] [place]\par Visit us at [facility]/orthopaedic\par

## 2022-05-10 NOTE — PHYSICAL EXAM
[de-identified] : General:\par Awake, alert, no acute distress, Patient was cooperative and appropriate during the examination.\par \par The patient is of normal weight for height and age.\par \par Walks without an antalgic gait.\par \par Full, painless range of motion of the neck and back.\par \par Exam of the bilateral lower extremities is intact and symmetric with regards to dermatologic, vascular, and neurologic exam. Bilateral lower extremity sensation is grossly intact to light touch in the DP/SP/T/S/S nerve distributions. Intact DF/PF/EHL. BIlateral lower extremities warm and well-perfused with brisk capillary refill.\par \par \par Pulmonary:\par Regular, nonlabored breathing\par \par Abdomen:\par Soft, nontender, nondistended.\par \par Lymphatic:\par No evidence of axillary lymphadenopathy\par \par Right shoulder Exam:\par Physical exam of the shoulder demonstrates normal skin without signs of skin changes or abnormalities. No erythema, warmth, or joint effusion appreciated. \par \par Sensation intact light touch C5-T1\par Palpable radial pulse\par Radial/ulnar/median/axillary/musculocutaneous/AIN/PIN nerves grossly intact\par \par Range of motion:\par Forward Flexion: 160 active and passive\par Abduction: 120 active and passive\par External Rotation: 30 active and passive\par Internal Rotation: L5 active and passive\par \par Palpation:\par Mildly tender to palpation over the glenohumeral joint\par Not tender palpation over the rotator cuff insertion on the greater tuberosity\par Not tender to palpation over the AC joint\par Mildly tender to palpation of the biceps tendon/bicipital groove\par \par Strength testing:\par Supraspinatus: 5/5\par Infraspinatus: 5/5\par Subscapularis: 5/5\par \par Special test:\par Empty can test negative \par Rodrigues impingement test mildly positive\par Speeds test negative\par Hancock's test negative\par Lift-off test negative\par Bell-press test negative\par Cross-arm adduction test negative\par \par  [de-identified] : X-rays including 4 views of the right shoulder were obtained in the office on 5/10/2022 and reviewed with the patient.  There is no acute fracture or dislocation.  The patient has advanced glenohumeral osteoarthritis with joint space narrowing and osteophyte formation. \par \par MRI from  radiology in October 2020 demonstrated moderate to advanced glenohumeral osteoarthritis with an intact rotator cuff.\par \par \par

## 2022-05-10 NOTE — DISCUSSION/SUMMARY
[de-identified] : Assessment: 66-year-old male with right shoulder pain and stiffness secondary to advanced glenohumeral osteoarthritis\par \par Plan: I had a long discussion with the patient today regarding the nature of their diagnosis and treatment plan. We discussed the risks and benefits of no treatment as well as nonoperative and operative treatments.  I reviewed the patient's x-rays today with him in the office which demonstrate advanced glenohumeral osteoarthritis.  At this time I recommend conservative treatment of the patient's condition with modalities including rest, ice, heat, anti-inflammatory medications, activity modifications, and home stretching and strengthening exercises daily. I discussed with the patient the risks and benefits associated with NSAID use.  The patient deferred any further physical therapy at this time since he is already done a course of physical therapy without much benefit.  He will continue to do home exercises on his own.  We discussed possible ultrasound-guided glenohumeral joint cortisone injections.  He would like to think on this further before making a decision.  Should he wish to have an injection he will call my office and we may put the referral in for him.  Otherwise he will follow-up with me in 3 months as needed.  If he does fail all conservative options he would be a candidate for a total shoulder replacement in the future.\par \par The patient verbalizes their understanding and agrees with the plan.  All questions were answered to their satisfaction.

## 2022-05-10 NOTE — HISTORY OF PRESENT ILLNESS
[Pain Location] : pain [] : right shoulder [Worsening] : worsening [Intermit.] : ~He/She~ states the symptoms seem to be intermittent [Direct Pressure] : worsened by direct pressure [Lifting] : worsened by lifting [Rest] : relieved by rest [6] : a current pain level of 6/10 [8] : an average pain level of 8/10 [7] : a minimum pain level of 7/10 [10] : a maximum pain level of 10/10 [de-identified] : 5/10/2022: Matthew is a pleasant 66-year-old right-hand-dominant male presents to the office today complaining of right shoulder pain and stiffness.  The patient states he has had pain in his shoulder which is gotten progressively worse over the past couple of years.  He was being treated by Dr. Beckett who recommended conservative treatments for glenohumeral osteoarthritis.  He completed a course of physical therapy and is taking anti-inflammatories.  He also does home exercises on his own at home.  He does continue to have pain and stiffness and presented to see me for another opinion.  He is reasonably active and likes to do weightlifting exercises on his own but his pain is now limiting his ability to do so.  He has never had a cortisone injection for the right shoulder before.  His pain is primarily activity related and alleviated by rest.  It does bother him when he sleeps at night.  The patient denies any fevers, chills, sweats, recent illnesses, numbness, tingling, weakness, or pain elsewhere at this time.

## 2022-07-14 ENCOUNTER — APPOINTMENT (OUTPATIENT)
Dept: ORTHOPEDIC SURGERY | Facility: CLINIC | Age: 67
End: 2022-07-14

## 2022-07-14 PROCEDURE — 99214 OFFICE O/P EST MOD 30 MIN: CPT | Mod: 25

## 2022-07-14 PROCEDURE — 73030 X-RAY EXAM OF SHOULDER: CPT | Mod: RT

## 2022-07-14 PROCEDURE — 20611 DRAIN/INJ JOINT/BURSA W/US: CPT | Mod: RT

## 2022-07-14 NOTE — PHYSICAL EXAM
[de-identified] : Physical Exam:\par General: Well appearing, no acute distress\par Neurologic: A&Ox3, No focal deficits\par Head: NCAT without abrasions, lacerations, or ecchymosis to head, face, or scalp\par Eyes: No scleral icterus, no gross abnormalities\par Respiratory: Equal chest wall expansion bilaterally, no accessory muscle use\par Lymphatic: No lymphadenopathy palpated\par Skin: Warm and dry\par Psychiatric: Normal mood and affect\par \par Right Shoulder\par ·	Inspection/Palpation: no tenderness, swelling or deformities\par ·	Range of Motion: no crepitus with ROM; Active  with hitching; ER at side 15; IR to L1; Passive FF 70; ER at side 15; IR to L1\par ·	Strength: forward elevation in scapular plane 5/5, internal rotation 5/5, external rotation 5/5, adduction 5/5 and abduction 5/5 \par ·	Stability: no joint instability on provocative testing\par ·	Tests: Rodrigues test positive, Neer positive, positive drop arm test secondary to pain, bear hug test positive, Napolean sign negative, cross arm adduction negative, lift off sign positive, hornblowers sign negative, speeds test negative, Yergason's test negative, no bicipital groove tenderness, Lima's Active Compression test negative, whipple test POS, bicep's load II test negative\par \par Left Shoulder\par ·	Inspection/Palpation: no tenderness, swelling or deformities\par ·	Range of Motion: no crepitus with ROM; Active ; ER at side 25; IR to L4; Passive ; ER at side 35; IR to L4\par ·	Strength: forward elevation in scapular plane [4/5], internal rotation [4/5], external rotation [4/5], adduction [4/5] and abduction [4/5]\par ·	Stability: no joint instability on provocative testing\par ·	Tests: Rodrigues test positive, Neer positive, positive drop arm test secondary to pain, bear hug test positive, Napolean sign negative, cross arm adduction negative, lift off sign positive, hornblowers sign negative, speeds test negative, Yergason's test negative, no bicipital groove tenderness, Lima's Active Compression test negative, whipple test POS, bicep's load II test negative  [de-identified] : The following radiographs were ordered by Dr. Levine and read by me during this patients visit. I reviewed each radiograph in detail with the patient and discussed the findings as highlighted below. \par \par 4 views of the Right (R) shoulder show Osteophyte formation noted, goat's beard noted, no fracture, dislocation or bony lesions. There is evidence of degenerative change in the glenohumeral and acromioclavicular joints with severe narrowing of the joint space. Otherwise unremarkable.

## 2022-07-14 NOTE — REVIEW OF SYSTEMS
[Arthralgia] : arthralgia [Joint Pain] : joint pain [Negative] : Heme/Lymph [FreeTextEntry9] : R shoulder

## 2022-07-14 NOTE — PROCEDURE
[de-identified] : Injection: US guided Right (R) glenohumeral joint.\par Indication: OA \par \par A discussion was had with the patient regarding this procedure and all questions were answered. All risks, benefits and alternatives were discussed. These included but were not limited to bleeding, infection, and allergic reaction. Alcohol was used to clean the skin, and chlorhexidine was used to sterilize and prep the area in the anterior aspect of the shoulder. Ethyl chloride spray was then used as a topical anesthetic. . A 22-gauge needle was used to inject 3cc 1% xylocaine, 2cc 0.25% bupivacaine and 1cc of 40mg/mL triamcinolone acetonide into the Right (R) GH joint. An ultrasound guidance was used for adequate placement of needle. A sterile bandage was then applied. The patient tolerated the procedure well and there were no complications. \par \par

## 2022-07-14 NOTE — HISTORY OF PRESENT ILLNESS
[Worsening] : worsening [___ yrs] : [unfilled] year(s) ago [de-identified] : RICHARD MAGNANI is a 66 year male being seen for initial visit R shoulder pain for several years. He reports constant pain since past few years with no specific KAREN. He localizes most of his pain over GH, and shoulder overall. He has seen Dr. Levine who took xray and suggested surgery. Patient reports currently regressing ROM and strength. He occasionally takes advil, acetaminophen that provides relief.

## 2022-07-14 NOTE — DISCUSSION/SUMMARY
[de-identified] : RICHARD MAGNANI is a 66 year male being seen for initial visit R shoulder pain for several years. He reports constant pain since past few years with no specific KAREN. He localizes most of his pain over GH, and shoulder overall. He has seen Dr. Levine who took xray and suggested surgery. Patient reports currently regressing ROM and strength. He occasionally takes advil, acetaminophen that provides relief. \par \par We had a thorough discussion regarding the nature of his pain, the pathophysiology, as well as all treatment options. I discussed the treatment of degenerative arthritis with the patient at length today. I described the spectrum of treatment from nonoperative modalities to total joint arthroplasty. Noninvasive and nonoperative treatment modalities include activity modification with low impact exercise, PRN use of acetaminophen or anti-inflammatory medication if tolerated, glucosamine/chondroitin supplements, and physical therapy. Further treatments can include corticosteroid injection and the use of hyaluronic acid injections. Definitive treatment can certainly include total joint arthroplasty also. The risks and benefits of each treatment options was discussed and all questions were answered.\par  \par Pt due to his acute pain elected for a corticosteroid injection at today's visit and tolerated the procedure well. He should take it easy for the next 2-3 days while icing the joint. Conservative measures of treatment include rest until asymptomatic, activity avoidance, NSAID's PRN, application to ice to the area 2-3x daily for 20 minutes, with gradual return to activities. Patient will follow up in 6-8 wks for repeat clinical assessment. All questions were answered and the patient verbalized understanding. The patient is in agreement with this treatment plan.

## 2022-10-17 ENCOUNTER — RESULT REVIEW (OUTPATIENT)
Age: 67
End: 2022-10-17

## 2022-10-17 ENCOUNTER — NON-APPOINTMENT (OUTPATIENT)
Age: 67
End: 2022-10-17

## 2022-10-17 ENCOUNTER — APPOINTMENT (OUTPATIENT)
Dept: FAMILY MEDICINE | Facility: CLINIC | Age: 67
End: 2022-10-17

## 2022-10-17 VITALS
BODY MASS INDEX: 27.36 KG/M2 | HEIGHT: 72 IN | DIASTOLIC BLOOD PRESSURE: 80 MMHG | SYSTOLIC BLOOD PRESSURE: 120 MMHG | RESPIRATION RATE: 14 BRPM | HEART RATE: 56 BPM | OXYGEN SATURATION: 98 % | WEIGHT: 202 LBS | TEMPERATURE: 97.5 F

## 2022-10-17 DIAGNOSIS — Z78.9 OTHER SPECIFIED HEALTH STATUS: ICD-10-CM

## 2022-10-17 DIAGNOSIS — N60.02 SOLITARY CYST OF LEFT BREAST: ICD-10-CM

## 2022-10-17 DIAGNOSIS — G43.009 MIGRAINE W/OUT AURA, NOT INTRACTABLE, W/OUT STATUS MIGRAINOSUS: ICD-10-CM

## 2022-10-17 DIAGNOSIS — Z92.29 PERSONAL HISTORY OF OTHER DRUG THERAPY: ICD-10-CM

## 2022-10-17 DIAGNOSIS — Z87.898 PERSONAL HISTORY OF OTHER SPECIFIED CONDITIONS: ICD-10-CM

## 2022-10-17 PROCEDURE — G0010: CPT

## 2022-10-17 PROCEDURE — G0439: CPT

## 2022-10-17 PROCEDURE — G0008: CPT

## 2022-10-17 PROCEDURE — 90662 IIV NO PRSV INCREASED AG IM: CPT

## 2022-10-17 PROCEDURE — 93000 ELECTROCARDIOGRAM COMPLETE: CPT | Mod: 59

## 2022-10-17 PROCEDURE — 36415 COLL VENOUS BLD VENIPUNCTURE: CPT

## 2022-10-17 PROCEDURE — 90739 HEPB VACC 2/4 DOSE ADULT IM: CPT

## 2022-10-17 RX ORDER — NAPROXEN 500 MG/1
500 TABLET ORAL
Qty: 60 | Refills: 1 | Status: DISCONTINUED | COMMUNITY
Start: 2022-02-10 | End: 2022-10-17

## 2022-10-17 RX ORDER — PNV NO.95/FERROUS FUM/FOLIC AC 28MG-0.8MG
100 TABLET ORAL
Refills: 0 | Status: ACTIVE | COMMUNITY
Start: 2022-10-17

## 2022-10-17 RX ORDER — METHYLPREDNISOLONE 4 MG/1
4 TABLET ORAL
Qty: 1 | Refills: 0 | Status: DISCONTINUED | COMMUNITY
Start: 2022-02-10 | End: 2022-10-17

## 2022-10-17 NOTE — ASSESSMENT
[FreeTextEntry1] : Preventive services summary provided to patient.  See scanned document.\par Patient is due for the following preventive services:\par 1.  Colonoscopy due in 2026.\par 2.  Flu shot.  To be administered today.\par 3.  Glaucoma screening.\par 4.  hepatitis B screening.  Ordered today.\par 5.  Hepatitis C screening.  Ordered today.\par 6.  HIV screening.  Patient declined.\par 7.  Pneumovax.  Will administer today.\par 8.  STI screening.  Ordered today.\par 9.  Skin cancer screening.\par 10. Tdap.  Will defer to follow-up.

## 2022-10-17 NOTE — DATA REVIEWED
[FreeTextEntry1] : EKG: Sinus bradycardia 45 bpm.  RSR prime V1.  LAE.  No acute or interval change.

## 2022-10-17 NOTE — HEALTH RISK ASSESSMENT
[HIV test declined] : HIV test declined [Hepatitis C test declined] : Hepatitis C test declined [Fully functional (bathing, dressing, toileting, transferring, walking, feeding)] : Fully functional (bathing, dressing, toileting, transferring, walking, feeding) [Fully functional (using the telephone, shopping, preparing meals, housekeeping, doing laundry, using] : Fully functional and needs no help or supervision to perform IADLs (using the telephone, shopping, preparing meals, housekeeping, doing laundry, using transportation, managing medications and managing finances) [Yes] : Yes [2 - 3 times a week (3 pts)] : 2 - 3  times a week (3 points) [1 or 2 (0 pts)] : 1 or 2 (0 points) [Never (0 pts)] : Never (0 points) [No] : In the past 12 months have you used drugs other than those required for medical reasons? No [No falls in past year] : Patient reported no falls in the past year [0] : 2) Feeling down, depressed, or hopeless: Not at all (0) [PHQ-2 Negative - No further assessment needed] : PHQ-2 Negative - No further assessment needed [] : No [Audit-CScore] : 3 [[id number]] : 0 [LowDoseCTScan] : Not clinically indicated. 10/2021 [MammogramDate] : 11/2020 [MammogramComments] : birads 2 [ColonoscopyDate] : 02/2016 [ColonoscopyComments] : polyp in rectum, polypectomy performed. Repeat colonoscopy in 3 yrs.

## 2022-10-17 NOTE — PHYSICAL EXAM
[No Acute Distress] : no acute distress [Well Nourished] : well nourished [Well Developed] : well developed [Normal Sclera/Conjunctiva] : normal sclera/conjunctiva [PERRL] : pupils equal round and reactive to light [No JVD] : no jugular venous distention [No Respiratory Distress] : no respiratory distress  [Clear to Auscultation] : lungs were clear to auscultation bilaterally [No Edema] : there was no peripheral edema [No Axillary Lymphadenopathy] : no axillary lymphadenopathy [No Focal Deficits] : no focal deficits [Normal Mood] : the mood was normal [Normal] : affect was normal and insight and judgment were intact [de-identified] : There is an approx 1 x 2 cm cyst left breast UOQ approximate to nipple, mobile nontender. [de-identified] : Mild crepitus right shoulder with movement. Mild restriction

## 2022-10-17 NOTE — ASSESSMENT
[FreeTextEntry1] : Pt asymptomatic from sinus bradycardia likely due to high vagal tone / regular increased exercise.\par Labs drawn in office.\par Medicare Wellness summary sheet provided to pt. See scanned copy.\par Hep B and flu vaccine recommended. Risks/benefits reviewed.\par Further recommendations pending test results.\par \par

## 2022-10-17 NOTE — HEALTH RISK ASSESSMENT
[Very Good] : ~his/her~ current health as very good [Good] : ~his/her~  mood as  good [Never] : Never [Yes] : Yes [1 or 2 (0 pts)] : 1 or 2 (0 points) [Monthly (2 pts)] : Monthly (2 points) [No] : In the past 12 months have you used drugs other than those required for medical reasons? No [No falls in past year] : Patient reported no falls in the past year [0] : 2) Feeling down, depressed, or hopeless: Not at all (0) [PHQ-2 Negative - No further assessment needed] : PHQ-2 Negative - No further assessment needed [Patient reported colonoscopy was normal] : Patient reported colonoscopy was normal [Hepatitis C test declined] : Hepatitis C test declined [Independent] : managing finances [Audit-CScore] : 2 [[id number]] : 0 [ColonoscopyDate] : 02/2016 [ColonoscopyComments] : Polypectomy done. Recommended repeat in 3 yrs. [HepatitisCDate] : 10/2021 [HepatitisCComments] : Neg

## 2022-10-17 NOTE — PLAN
[FreeTextEntry1] : Clinical breast exam is negative.  Will defer mammogram/ultrasound screening.\par F/U 4-6 months.

## 2022-10-17 NOTE — HISTORY OF PRESENT ILLNESS
[FreeTextEntry1] : Pt presents for Medicare Annual Wellness visit.\par c/o persistent right shoulder pain, chronic, worse x several months.\par His right shoulder is arthritic. It is mainly worse after increased upper body movement/exercise.\par He has been advised to have right shoulder surgery by 2 separate orthopedists.\par Pt is hesitant to go forward with surgical recommendation and will seek 3rd opinion before deciding.\par Pt was experiencing neck pain and migraine headaches. He had partial relief of upper thoracic and neck pain and headaches resolved.\par  [de-identified] : Exercises regularly,\par Diet is high in plant based foods.

## 2022-10-20 ENCOUNTER — RESULT REVIEW (OUTPATIENT)
Age: 67
End: 2022-10-20

## 2022-10-20 ENCOUNTER — APPOINTMENT (OUTPATIENT)
Dept: CT IMAGING | Facility: CLINIC | Age: 67
End: 2022-10-20

## 2022-10-20 ENCOUNTER — OUTPATIENT (OUTPATIENT)
Dept: OUTPATIENT SERVICES | Facility: HOSPITAL | Age: 67
LOS: 1 days | End: 2022-10-20
Payer: MEDICARE

## 2022-10-20 DIAGNOSIS — Z00.8 ENCOUNTER FOR OTHER GENERAL EXAMINATION: ICD-10-CM

## 2022-10-20 LAB
ESTIMATED AVERAGE GLUCOSE: 111 MG/DL
HBA1C MFR BLD HPLC: 5.5 %

## 2022-10-20 PROCEDURE — 73200 CT UPPER EXTREMITY W/O DYE: CPT

## 2022-10-20 PROCEDURE — 73200 CT UPPER EXTREMITY W/O DYE: CPT | Mod: 26,RT,MH

## 2022-10-25 ENCOUNTER — TRANSCRIPTION ENCOUNTER (OUTPATIENT)
Age: 67
End: 2022-10-25

## 2022-10-26 ENCOUNTER — TRANSCRIPTION ENCOUNTER (OUTPATIENT)
Age: 67
End: 2022-10-26

## 2022-10-27 ENCOUNTER — APPOINTMENT (OUTPATIENT)
Dept: ORTHOPEDIC SURGERY | Facility: CLINIC | Age: 67
End: 2022-10-27

## 2022-10-27 PROCEDURE — 99214 OFFICE O/P EST MOD 30 MIN: CPT

## 2022-10-28 NOTE — PHYSICAL EXAM
[de-identified] : Physical Exam: \par General: Well appearing, no acute distress \par Neurologic: A&Ox3, No focal deficits \par Head: NCAT without abrasions, lacerations, or ecchymosis to head, face, or scalp \par Eyes: No scleral icterus, no gross abnormalities \par Respiratory: Equal chest wall expansion bilaterally, no accessory muscle use \par Lymphatic: No lymphadenopathy palpated \par Skin: Warm and dry \par Psychiatric: Normal mood and affect\par \par The right shoulder is examined and reveals no obvious deformity.  He has forward flexion to 130 degrees external rotation to 40 degrees.  His compartments appear soft and nontender.  He has full digital motion.  There is 2+ capillary refill and sensations intact distally. [de-identified] : Prior x-rays were reviewed which reveal severe glenohumeral joint osteoarthritis with a large inferior osteophyte.  There is severe joint space narrowing and some sclerotic changes as well as cystic changes in the humeral head.\par \par I had a [facility] facility on 10/20/2022 without contrast is reviewed.  This reveals severe glenohumeral joint arthritis with flattening of the humeral head and glenoid.  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  Of note the radiologist note dates a right apical pulmonary nodule that measures approximately 0.7 x 0.4 cm with questionable slight spiculations.

## 2022-10-28 NOTE — DISCUSSION/SUMMARY
[de-identified] : I had a lengthy discussion with the patient regarding their current condition. We discussed the treatment options including operative and nonoperative management. At this time I recommended conservative management.  We discussed in depth right shoulder arthroplasty.  We discussed the risk benefits and alternatives.  At this time he appears to be functioning well with minimal symptoms.  Advised him at some point he will likely require arthroplasty however at this time he can continue with nonoperative management if he chooses.  We discussed the potential for loss of motion, bleeding neurovascular injury, continued pain as well as dysfunction of the upper extremity.  We also discussed a preoperative nerve block and potential anesthesia complications.  I also reviewed his CT findings regarding his lung.  He states his mother has a history of TB, there is a remote smoking history of marijuana many many years ago.  He spoke with his sister regarding the CT findings.  I referred him to his primary care doctor and explained he may require pulmonology evaluation.  I explained to him he should have this performed before considering any type of surgical intervention.  He will follow-up with us as his symptoms dictate.  All questions were answered.  He also gave me permission to discuss his medical history and care plan with his sister.

## 2022-10-28 NOTE — HISTORY OF PRESENT ILLNESS
[de-identified] : Patient is a 66-year-old right-hand-dominant male here today for CT review and consultation regarding his severe right shoulder glenohumeral arthritis.  His sister is a physician in [place].  She is an ER doctor.  The patient had a cortisone injection in July which has given him significant relief of his pain.  He is minimally symptomatic at this time.  He plays saxophone.  Currently he is scheduled for total shoulder arthroplasty in December.  He is unsure if he wishes to proceed.

## 2022-11-15 ENCOUNTER — APPOINTMENT (OUTPATIENT)
Dept: FAMILY MEDICINE | Facility: CLINIC | Age: 67
End: 2022-11-15

## 2022-11-17 ENCOUNTER — APPOINTMENT (OUTPATIENT)
Dept: FAMILY MEDICINE | Facility: CLINIC | Age: 67
End: 2022-11-17

## 2022-11-17 PROCEDURE — 90739 HEPB VACC 2/4 DOSE ADULT IM: CPT

## 2022-11-17 PROCEDURE — G0010: CPT

## 2022-11-21 ENCOUNTER — RESULT REVIEW (OUTPATIENT)
Age: 67
End: 2022-11-21

## 2022-11-21 ENCOUNTER — OUTPATIENT (OUTPATIENT)
Dept: OUTPATIENT SERVICES | Facility: HOSPITAL | Age: 67
LOS: 1 days | End: 2022-11-21
Payer: MEDICARE

## 2022-11-21 ENCOUNTER — APPOINTMENT (OUTPATIENT)
Dept: MAMMOGRAPHY | Facility: CLINIC | Age: 67
End: 2022-11-21

## 2022-11-21 ENCOUNTER — APPOINTMENT (OUTPATIENT)
Dept: ULTRASOUND IMAGING | Facility: CLINIC | Age: 67
End: 2022-11-21

## 2022-11-21 ENCOUNTER — APPOINTMENT (OUTPATIENT)
Dept: CT IMAGING | Facility: CLINIC | Age: 67
End: 2022-11-21

## 2022-11-21 DIAGNOSIS — N60.02 SOLITARY CYST OF LEFT BREAST: ICD-10-CM

## 2022-11-21 DIAGNOSIS — R91.1 SOLITARY PULMONARY NODULE: ICD-10-CM

## 2022-11-21 PROCEDURE — G0279: CPT | Mod: 26

## 2022-11-21 PROCEDURE — 71250 CT THORAX DX C-: CPT | Mod: 26,MH

## 2022-11-21 PROCEDURE — 76642 ULTRASOUND BREAST LIMITED: CPT | Mod: 26,LT

## 2022-11-21 PROCEDURE — 71250 CT THORAX DX C-: CPT

## 2022-11-21 PROCEDURE — 77066 DX MAMMO INCL CAD BI: CPT | Mod: 26

## 2022-11-21 PROCEDURE — 77066 DX MAMMO INCL CAD BI: CPT

## 2022-11-21 PROCEDURE — G0279: CPT

## 2022-11-21 PROCEDURE — 76642 ULTRASOUND BREAST LIMITED: CPT

## 2022-12-05 ENCOUNTER — RX RENEWAL (OUTPATIENT)
Age: 67
End: 2022-12-05

## 2022-12-05 ENCOUNTER — TRANSCRIPTION ENCOUNTER (OUTPATIENT)
Age: 67
End: 2022-12-05

## 2022-12-07 ENCOUNTER — APPOINTMENT (OUTPATIENT)
Dept: FAMILY MEDICINE | Facility: CLINIC | Age: 67
End: 2022-12-07
Payer: MEDICARE

## 2022-12-07 VITALS
WEIGHT: 205 LBS | BODY MASS INDEX: 27.77 KG/M2 | RESPIRATION RATE: 16 BRPM | DIASTOLIC BLOOD PRESSURE: 86 MMHG | TEMPERATURE: 97.4 F | OXYGEN SATURATION: 98 % | HEIGHT: 72 IN | SYSTOLIC BLOOD PRESSURE: 133 MMHG | HEART RATE: 52 BPM

## 2022-12-07 VITALS — DIASTOLIC BLOOD PRESSURE: 70 MMHG | SYSTOLIC BLOOD PRESSURE: 124 MMHG

## 2022-12-07 DIAGNOSIS — Z12.5 ENCOUNTER FOR SCREENING FOR MALIGNANT NEOPLASM OF PROSTATE: ICD-10-CM

## 2022-12-07 DIAGNOSIS — Z87.2 PERSONAL HISTORY OF DISEASES OF THE SKIN AND SUBCUTANEOUS TISSUE: ICD-10-CM

## 2022-12-07 DIAGNOSIS — N60.19 DIFFUSE CYSTIC MASTOPATHY OF UNSPECIFIED BREAST: ICD-10-CM

## 2022-12-07 DIAGNOSIS — G89.29 HEADACHE, UNSPECIFIED: ICD-10-CM

## 2022-12-07 DIAGNOSIS — Z12.11 ENCOUNTER FOR SCREENING FOR MALIGNANT NEOPLASM OF COLON: ICD-10-CM

## 2022-12-07 DIAGNOSIS — Z00.00 ENCOUNTER FOR GENERAL ADULT MEDICAL EXAMINATION W/OUT ABNORMAL FINDINGS: ICD-10-CM

## 2022-12-07 DIAGNOSIS — M75.101 UNSPECIFIED ROTATOR CUFF TEAR OR RUPTURE OF RIGHT SHOULDER, NOT SPECIFIED AS TRAUMATIC: ICD-10-CM

## 2022-12-07 DIAGNOSIS — H90.5 UNSPECIFIED SENSORINEURAL HEARING LOSS: ICD-10-CM

## 2022-12-07 DIAGNOSIS — N60.12 DIFFUSE CYSTIC MASTOPATHY OF LEFT BREAST: ICD-10-CM

## 2022-12-07 DIAGNOSIS — Z92.89 PERSONAL HISTORY OF OTHER MEDICAL TREATMENT: ICD-10-CM

## 2022-12-07 DIAGNOSIS — Z20.9 CONTACT WITH AND (SUSPECTED) EXPOSURE TO UNSPECIFIED COMMUNICABLE DISEASE: ICD-10-CM

## 2022-12-07 DIAGNOSIS — R51.9 HEADACHE, UNSPECIFIED: ICD-10-CM

## 2022-12-07 DIAGNOSIS — Z92.29 PERSONAL HISTORY OF OTHER DRUG THERAPY: ICD-10-CM

## 2022-12-07 PROCEDURE — 99214 OFFICE O/P EST MOD 30 MIN: CPT

## 2022-12-07 NOTE — PHYSICAL EXAM
[No Acute Distress] : no acute distress [Well Nourished] : well nourished [Well Developed] : well developed [Normal Sclera/Conjunctiva] : normal sclera/conjunctiva [PERRL] : pupils equal round and reactive to light [No JVD] : no jugular venous distention [No Respiratory Distress] : no respiratory distress  [Clear to Auscultation] : lungs were clear to auscultation bilaterally [No Edema] : there was no peripheral edema [No Axillary Lymphadenopathy] : no axillary lymphadenopathy [No Focal Deficits] : no focal deficits [Normal Mood] : the mood was normal [Normal] : affect was normal and insight and judgment were intact [de-identified] : Mild crepitus right shoulder with movement. Mild restriction. Increased paracervical tone b/l L>R.

## 2022-12-07 NOTE — DATA REVIEWED
[FreeTextEntry1] : Labs 11/4/2022: Total cholesterol 187 triglycerides 94 HDL 63 \par CBC with differential, CMP, TSH all WNL.

## 2022-12-07 NOTE — HISTORY OF PRESENT ILLNESS
[FreeTextEntry1] : c/o right sided headache. Headache range from mild to mod, lasts mins to hours.\par Occ wakes him from sleep.\par c/o chronic neck pain, increasingly bothersome day to day.\par He has advanced degeneration of right shoulder and was advised to have right shoulder replacement. He may consider this in the near future.\par Patient is following up on CT chest to surveil RUL lung nodule.\par In addition, he had a mammogram and left breast ultrasound due to soft tissue prominence left breast.\par He also had blood work done 11/04 and would like to review results.\par Interestingly, he developed terrible cramps and pain on right foot, plantar surface soon after receiving a hepatitis vaccine. The pain lasted for a day and was worse with walking on it. The pain resolved spontaneously after a day.

## 2022-12-07 NOTE — ASSESSMENT
[FreeTextEntry1] : Mild elevation in LDL.  Continue adherence to low-cholesterol diet.\par Recommend orthopedic spine consult due to chronic neck pain which has been worse over the last few months.\par MRI of the head ordered due to worsening headache that occasionally wakes patient from sleep.\par Transient right foot pain after hepatitis B vaccine likely not related.\par Follow-up after MRI head.\par

## 2022-12-07 NOTE — PLAN
[FreeTextEntry1] : Pt encounter incorporated clinical review of the medical record including consultation from specialists, review of lab and diagnostic testing with interpretation and discussion of results with patient, general pt counseling, and coordination of care, as well as documentation update within the electronic medical record.\par \par Time spent: 37 mins.\par

## 2022-12-09 ENCOUNTER — APPOINTMENT (OUTPATIENT)
Dept: ORTHOPEDIC SURGERY | Facility: HOSPITAL | Age: 67
End: 2022-12-09

## 2022-12-22 ENCOUNTER — APPOINTMENT (OUTPATIENT)
Dept: ORTHOPEDIC SURGERY | Facility: CLINIC | Age: 67
End: 2022-12-22

## 2023-01-25 ENCOUNTER — APPOINTMENT (OUTPATIENT)
Dept: ORTHOPEDIC SURGERY | Facility: CLINIC | Age: 68
End: 2023-01-25

## 2023-02-14 RX ORDER — ROSUVASTATIN CALCIUM 5 MG/1
5 TABLET, FILM COATED ORAL
Refills: 0 | Status: ACTIVE | COMMUNITY
Start: 2023-02-14

## 2023-02-16 DIAGNOSIS — S16.1XXA STRAIN OF MUSCLE, FASCIA AND TENDON AT NECK LEVEL, INITIAL ENCOUNTER: ICD-10-CM

## 2023-03-15 ENCOUNTER — APPOINTMENT (OUTPATIENT)
Dept: ORTHOPEDIC SURGERY | Facility: CLINIC | Age: 68
End: 2023-03-15

## 2023-04-12 ENCOUNTER — APPOINTMENT (OUTPATIENT)
Dept: ORTHOPEDIC SURGERY | Facility: CLINIC | Age: 68
End: 2023-04-12
Payer: MEDICARE

## 2023-04-12 PROCEDURE — 99214 OFFICE O/P EST MOD 30 MIN: CPT | Mod: 25

## 2023-04-12 PROCEDURE — 20611 DRAIN/INJ JOINT/BURSA W/US: CPT | Mod: RT

## 2023-04-12 NOTE — REVIEW OF SYSTEMS
[Arthralgia] : arthralgia [Joint Pain] : joint pain [Joint Stiffness] : joint stiffness [Negative] : Heme/Lymph [FreeTextEntry9] : R shoulder

## 2023-04-12 NOTE — DISCUSSION/SUMMARY
[de-identified] : I had a lengthy discussion with the patient regarding their current condition. We discussed the treatment options including operative and nonoperative management. At this time I recommended conservative management.  We had a discussion regarding shoulder arthroplasty, repeat cortisone injection, physical therapy and conservative treatments.  At this time he does not feel his symptoms warrant arthroplasty.  I offered him another injection he like to proceed.  I advised him he can have this twice a year.  He will follow-up with us as his symptoms dictate.  All of his questions were answered.

## 2023-04-12 NOTE — PHYSICAL EXAM
[de-identified] :  Multi-System Physical Exam\par \par General: Well appearing, no acute distress \par Neurologic: A&Ox3, No focal deficits \par Head: NCAT without abrasions, lacerations, or ecchymosis to head, face, or scalp \par Eyes: No scleral icterus, no gross abnormalities \par Respiratory: Equal chest wall expansion bilaterally, no accessory muscle use \par Lymphatic: No lymphadenopathy palpated \par Skin: Warm and dry \par Psychiatric: Normal mood and affect\par \par Examination of the Right shoulder shows no obvious deformity, swelling or erythema. Mild tenderness to palpation over the anterior shoulder. No AC joint tenderness. The patient demonstrates active/passive ROM of Forward Flexion to 115 degrees, External Rotation to 30 degrees and Internal Rotation to a mid lumbar level. The patient has 4/5 strength to forward flexion with pronation, internal and external rotation. Compartments are soft and nontender. The patient has 2+ cap refill and sensation is intact in the hand. \par \par Left shoulder shows no deformity. No tenderness to palpation over the biceps or AC joint. The patient has Forward Flexion to 170 degrees, External Rotation to 45 degrees and Internal Rotation to a mid lumbar level. 5/5 strength to forward flexion with pronation, internal and external rotation. Compartments are soft and nontender. 2+ cap refill. Sensation intact distally.\par

## 2023-04-12 NOTE — PROCEDURE
[de-identified] : Injection: US guided Right (R) glenohumeral joint.\par Indication: OA \par \par A discussion was had with the patient regarding this procedure and all questions were answered. All risks, benefits and alternatives were discussed. These included but were not limited to bleeding, infection, and allergic reaction. Alcohol was used to clean the skin, and chlorhexidine was used to sterilize and prep the area in the anterior aspect of the shoulder. Ethyl chloride spray was then used as a topical anesthetic.. A 22-gauge needle was used to inject 3cc 1% xylocaine, 2cc 0.25% bupivacaine and 1cc of 40mg/mL triamcinolone acetonide into the Right (R) GH joint. An ultrasound guidance was used for adequate placement of needle. A sterile bandage was then applied. The patient tolerated the procedure well and there were no complications

## 2023-04-12 NOTE — HISTORY OF PRESENT ILLNESS
[de-identified] : RICHARD MAGNANI is a 67 year male being seen for f/u visit R shoulder pain. He received R shoulder glenohumeral joint cortisone injection with relief on 7/14/2022. He was considering undergoing shoulder replacement but decided to postpone due to mild pain level. Currently, he reports intermittent pain with lifting. He reports he is able to do his daily activities without much difficulty. He endorses shoulder stiffness. He would like to wait to undergo shoulder replacement. He saw another physician in [place] who suggested patient undergo R reverse shoulder replacement.  He is an avid golfer and continues to fish and work-out.

## 2023-05-17 ENCOUNTER — TRANSCRIPTION ENCOUNTER (OUTPATIENT)
Age: 68
End: 2023-05-17

## 2023-05-23 ENCOUNTER — TRANSCRIPTION ENCOUNTER (OUTPATIENT)
Age: 68
End: 2023-05-23

## 2023-05-24 ENCOUNTER — TRANSCRIPTION ENCOUNTER (OUTPATIENT)
Age: 68
End: 2023-05-24

## 2023-05-30 ENCOUNTER — RX RENEWAL (OUTPATIENT)
Age: 68
End: 2023-05-30

## 2023-05-31 ENCOUNTER — LABORATORY RESULT (OUTPATIENT)
Age: 68
End: 2023-05-31

## 2023-05-31 ENCOUNTER — APPOINTMENT (OUTPATIENT)
Dept: FAMILY MEDICINE | Facility: CLINIC | Age: 68
End: 2023-05-31
Payer: MEDICARE

## 2023-05-31 VITALS
DIASTOLIC BLOOD PRESSURE: 74 MMHG | HEART RATE: 56 BPM | BODY MASS INDEX: 27.77 KG/M2 | HEIGHT: 72 IN | OXYGEN SATURATION: 97 % | SYSTOLIC BLOOD PRESSURE: 120 MMHG | TEMPERATURE: 98.06 F | WEIGHT: 205 LBS

## 2023-05-31 DIAGNOSIS — R10.31 RIGHT LOWER QUADRANT PAIN: ICD-10-CM

## 2023-05-31 PROCEDURE — 36415 COLL VENOUS BLD VENIPUNCTURE: CPT

## 2023-05-31 PROCEDURE — 99214 OFFICE O/P EST MOD 30 MIN: CPT | Mod: 25

## 2023-05-31 NOTE — ASSESSMENT
[FreeTextEntry1] : Patient clinically with cluster headaches.\par Recommend treatment of acute episodes with zolmitriptan nasal spray.\par Patient is not a candidate for verapamil prophylaxis due to his baseline bradycardia.\par Recommend oxygen therapy for treatment of acute cluster headaches.\par Oxygen can be delivered at 12 L/min via nonrebreather mask for 15 minutes.  This would be considered first-line for treatment of acute cluster headache due to potential side effects of triptan therapy.\par Will investigate gamma core stimulator as additional tool for treatment. \par Follow-up with neurology.  Patient has appointment.\par Follow-up after trial of the zolmitriptan.  Risk/benefits reviewed.\par Right inguinal pain is likely musculoskeletal in origin based on clinical exam.\par He is currently asymptomatic.  Patient is to follow-up if right inguinal pain recurs and/or persists.\par Labs drawn in office.\par

## 2023-05-31 NOTE — PHYSICAL EXAM
[No Acute Distress] : no acute distress [Well Nourished] : well nourished [Well-Appearing] : well-appearing [Normal Sclera/Conjunctiva] : normal sclera/conjunctiva [PERRL] : pupils equal round and reactive to light [EOMI] : extraocular movements intact [Fundoscopic Exam Performed] : fundoscopic ~T exam ~C was performed [Normal Oropharynx] : the oropharynx was normal [No Lymphadenopathy] : no lymphadenopathy [Supple] : supple [Thyroid Normal, No Nodules] : the thyroid was normal and there were no nodules present [No Respiratory Distress] : no respiratory distress  [Clear to Auscultation] : lungs were clear to auscultation bilaterally [No Carotid Bruits] : no carotid bruits [No Edema] : there was no peripheral edema [Flat] : flat [No Mass] : no masses were palpated [No HSM] : no hepatosplenomegaly noted [None] : no CVA tenderness [No Rash] : no rash [Cranial Nerves Optic (II)] : visual acuity and visual fields were intact [Cranial Nerves Oculomotor (III)] : the extraocular motions were intact [Cranial Nerves Trigeminal (V)] : sensation to the face and masseter strength were intact [Cranial Nerves Facial (VII)] : facial strength was intact bilaterally [Cranial Nerves Vestibulocochlear (VIII)] : hearing was intact [Cranial Nerves Glossopharyngeal (IX)] : there was normal movement of the soft palate and normal gag [Cranial Nerves Accessory (XI - Cranial And Spinal)] : shoulder shrug was intact bilaterally [Cranial Nerves Hypoglossal (XII)] : there was no tongue deviation with protrusion [Limited Balance] : the patient's balance was impaired [Normal] : affect was normal and insight and judgment were intact [Rovsing's] : a negative Rovsing's sign [Obturator] : a negative obturator sign [Psoas] : a negative psoas sign [Romberg's Sign] : Romberg's sign was negtive [Past-pointing] : there was no past-pointing [Dysdiadochokinesia Bilaterally] : not present [Coordination - Dysmetria Impaired Finger-to-Nose Bilateral] : not present [Coordination - Dysmetria Impaired Heel-to-Shin Bilateral] : not present [Plantar Reflex Right Only] : absent on the right [Plantar Reflex Left Only] : absent on the left [de-identified] : Wears hearing aids b/l

## 2023-05-31 NOTE — HISTORY OF PRESENT ILLNESS
[FreeTextEntry8] : Patient c/o cluster headaches. \par Pt experiences right sided h/a, with pain at right temple, behind the eye, right nasal cavity and right side roof of the mouth.\par Headache pain is very sharp and intense, often lasting seconds but lingering for minutes to hours but less intense.\par Right eye sales when the pain is present. No eye redness. No nasal drip. Vision not affected.\par Headaches occurring approximately 3-4 x / year, approx 4-6 x /d for a duration of about 1 week.\par He did research that a Gamma core stimulator could be effective treatment tool.\par c/o RLQ abdominal pain x 1 week, intermittent.\par Pain is aching, lasting up to 10 mins.  No known exacerbating or relieving factors.\par No change in urine or bowel habits.\par

## 2023-05-31 NOTE — PLAN
[FreeTextEntry1] : Pt encounter incorporated clinical review of the medical record including review of lab and diagnostic testing with interpretation and discussion of results with patient, general pt counseling, and coordination of care, as well as documentation update within the electronic medical record.\par \par Time spent: 38 mins.\par

## 2023-05-31 NOTE — DATA REVIEWED
[FreeTextEntry1] : MRI brain pre and post IV contrast 12/14/2022: Mild chronic microvascular ischemic changes.

## 2023-06-01 ENCOUNTER — TRANSCRIPTION ENCOUNTER (OUTPATIENT)
Age: 68
End: 2023-06-01

## 2023-06-02 ENCOUNTER — TRANSCRIPTION ENCOUNTER (OUTPATIENT)
Age: 68
End: 2023-06-02

## 2023-06-06 ENCOUNTER — TRANSCRIPTION ENCOUNTER (OUTPATIENT)
Age: 68
End: 2023-06-06

## 2023-06-09 ENCOUNTER — TRANSCRIPTION ENCOUNTER (OUTPATIENT)
Age: 68
End: 2023-06-09

## 2023-06-13 ENCOUNTER — TRANSCRIPTION ENCOUNTER (OUTPATIENT)
Age: 68
End: 2023-06-13

## 2023-06-13 LAB
ANA SER IF-ACNC: NEGATIVE
ANION GAP SERPL CALC-SCNC: 11 MMOL/L
BUN SERPL-MCNC: 15 MG/DL
CALCIUM SERPL-MCNC: 10.2 MG/DL
CHLORIDE SERPL-SCNC: 103 MMOL/L
CO2 SERPL-SCNC: 26 MMOL/L
CREAT SERPL-MCNC: 0.92 MG/DL
CRP SERPL-MCNC: <3 MG/L
EGFR: 91 ML/MIN/1.73M2
ENA SS-A AB SER IA-ACNC: 0.2 AL
ENA SS-B AB SER IA-ACNC: <0.2 AL
ESTIMATED AVERAGE GLUCOSE: 117 MG/DL
GLUCOSE SERPL-MCNC: 63 MG/DL
HBA1C MFR BLD HPLC: 5.7 %
POTASSIUM SERPL-SCNC: 5.1 MMOL/L
RHEUMATOID FACT SER QL: 11 IU/ML
SODIUM SERPL-SCNC: 140 MMOL/L
VIT B12 SERPL-MCNC: 946 PG/ML

## 2023-06-14 ENCOUNTER — TRANSCRIPTION ENCOUNTER (OUTPATIENT)
Age: 68
End: 2023-06-14

## 2023-06-15 ENCOUNTER — TRANSCRIPTION ENCOUNTER (OUTPATIENT)
Age: 68
End: 2023-06-15

## 2023-06-16 ENCOUNTER — TRANSCRIPTION ENCOUNTER (OUTPATIENT)
Age: 68
End: 2023-06-16

## 2023-06-19 ENCOUNTER — TRANSCRIPTION ENCOUNTER (OUTPATIENT)
Age: 68
End: 2023-06-19

## 2023-06-19 ENCOUNTER — LABORATORY RESULT (OUTPATIENT)
Age: 68
End: 2023-06-19

## 2023-06-21 ENCOUNTER — TRANSCRIPTION ENCOUNTER (OUTPATIENT)
Age: 68
End: 2023-06-21

## 2023-06-22 ENCOUNTER — TRANSCRIPTION ENCOUNTER (OUTPATIENT)
Age: 68
End: 2023-06-22

## 2023-06-23 ENCOUNTER — TRANSCRIPTION ENCOUNTER (OUTPATIENT)
Age: 68
End: 2023-06-23

## 2023-06-26 ENCOUNTER — TRANSCRIPTION ENCOUNTER (OUTPATIENT)
Age: 68
End: 2023-06-26

## 2023-06-30 ENCOUNTER — TRANSCRIPTION ENCOUNTER (OUTPATIENT)
Age: 68
End: 2023-06-30

## 2023-07-06 ENCOUNTER — TRANSCRIPTION ENCOUNTER (OUTPATIENT)
Age: 68
End: 2023-07-06

## 2023-07-09 ENCOUNTER — TRANSCRIPTION ENCOUNTER (OUTPATIENT)
Age: 68
End: 2023-07-09

## 2023-07-12 ENCOUNTER — TRANSCRIPTION ENCOUNTER (OUTPATIENT)
Age: 68
End: 2023-07-12

## 2023-07-13 ENCOUNTER — APPOINTMENT (OUTPATIENT)
Dept: FAMILY MEDICINE | Facility: CLINIC | Age: 68
End: 2023-07-13

## 2023-07-26 ENCOUNTER — APPOINTMENT (OUTPATIENT)
Dept: FAMILY MEDICINE | Facility: CLINIC | Age: 68
End: 2023-07-26

## 2023-07-26 ENCOUNTER — APPOINTMENT (OUTPATIENT)
Dept: FAMILY MEDICINE | Facility: CLINIC | Age: 68
End: 2023-07-26
Payer: MEDICARE

## 2023-07-26 DIAGNOSIS — M10.9 GOUT, UNSPECIFIED: ICD-10-CM

## 2023-07-26 DIAGNOSIS — G44.009 CLUSTER HEADACHE SYNDROME, UNSPECIFIED, NOT INTRACTABLE: ICD-10-CM

## 2023-07-26 DIAGNOSIS — R29.898 OTHER SYMPTOMS AND SIGNS INVOLVING THE MUSCULOSKELETAL SYSTEM: ICD-10-CM

## 2023-07-26 DIAGNOSIS — M54.81 OCCIPITAL NEURALGIA: ICD-10-CM

## 2023-07-26 PROCEDURE — 99214 OFFICE O/P EST MOD 30 MIN: CPT | Mod: 95

## 2023-07-26 NOTE — PLAN
[FreeTextEntry1] : Pt encounter incorporated clinical review of the medical record including consultation from specialists, review of lab testing with interpretation and discussion of results with patient, general pt counseling, and coordination of care, as well as documentation update within the electronic medical record.\par \par Time spent: 30 mins.\par

## 2023-07-26 NOTE — HISTORY OF PRESENT ILLNESS
[FreeTextEntry1] : Pt c/o chronic right sided neck pain and headaches.\par Pain is located at the right lower lateral neck, worse with neck flexion and rotation.\par Neck pain is very intense during flares. \par Headaches are chronic, intermittent and worse behind the right eye. Pt was to try O2 and gamma core stimulator for tx of cluster headaches but he has not received the O2 or the stimulator due to processing delays with the respective vendors. He saw his neurologist  and given trial of Zomig for cluster headaches but it did not help.\par Pt was advised tx for occipital neuralgia and is to f/u with pain management.\par Pt c/o clicking sensation to his index and middle fingers of the left hand when he flexes and extends those digits. He is wondering if it is related to his gout. Uric acid level was last recorded 1 month ago at 5.1 but he temporarily increased the allopurinol to 300 mg instead of 200 mg the day prior to the lab test. He is wondering if his uric acid level is actually running higher on the allopurinol 200 mg/d dosing.\par He denies any pain of the toes, knees, or wrists. [FreeTextEntry8] : 5/31/23:\par Patient c/o cluster headaches. \par Pt experiences right sided h/a, with pain at right temple, behind the eye, right nasal cavity and right side roof of the mouth.\par Headache pain is very sharp and intense, often lasting seconds but lingering for minutes to hours but less intense.\par Right eye sales when the pain is present. No eye redness. No nasal drip. Vision not affected.\par Headaches occurring approximately 3-4 x / year, approx 4-6 x /d for a duration of about 1 week.\par He did research that a Gamma core stimulator could be effective treatment tool.\par c/o RLQ abdominal pain x 1 week, intermittent.\par Pain is aching, lasting up to 10 mins.  No known exacerbating or relieving factors.\par No change in urine or bowel habits.\par

## 2023-07-26 NOTE — ASSESSMENT
[FreeTextEntry1] : Agree with pain management for tx of occipital neuralgia.\par Would still proceed with O2 tx and gamma core stimulator for tx of cluster headaches.\par Will have staff reach out again to respiratory care company.\par Rx for gamma core stimulator under review due to stipulations presented by the vendor for physician rx.\par F/U 1-2 months, sooner if worse.

## 2023-07-28 ENCOUNTER — TRANSCRIPTION ENCOUNTER (OUTPATIENT)
Age: 68
End: 2023-07-28

## 2023-08-14 ENCOUNTER — TRANSCRIPTION ENCOUNTER (OUTPATIENT)
Age: 68
End: 2023-08-14

## 2023-08-14 LAB
ANION GAP SERPL CALC-SCNC: 13 MMOL/L
BUN SERPL-MCNC: 13 MG/DL
CALCIUM SERPL-MCNC: 10.1 MG/DL
CHLORIDE SERPL-SCNC: 104 MMOL/L
CO2 SERPL-SCNC: 26 MMOL/L
CREAT SERPL-MCNC: 0.9 MG/DL
EGFR: 94 ML/MIN/1.73M2
GLUCOSE SERPL-MCNC: 79 MG/DL
POTASSIUM SERPL-SCNC: 4.7 MMOL/L
SODIUM SERPL-SCNC: 143 MMOL/L
URATE SERPL-MCNC: 4.9 MG/DL

## 2023-08-16 ENCOUNTER — TRANSCRIPTION ENCOUNTER (OUTPATIENT)
Age: 68
End: 2023-08-16

## 2023-08-22 ENCOUNTER — TRANSCRIPTION ENCOUNTER (OUTPATIENT)
Age: 68
End: 2023-08-22

## 2023-08-23 ENCOUNTER — TRANSCRIPTION ENCOUNTER (OUTPATIENT)
Age: 68
End: 2023-08-23

## 2023-08-29 ENCOUNTER — TRANSCRIPTION ENCOUNTER (OUTPATIENT)
Age: 68
End: 2023-08-29

## 2023-09-19 ENCOUNTER — APPOINTMENT (OUTPATIENT)
Dept: PHYSICAL MEDICINE AND REHAB | Facility: CLINIC | Age: 68
End: 2023-09-19
Payer: MEDICARE

## 2023-09-19 VITALS
BODY MASS INDEX: 27.77 KG/M2 | SYSTOLIC BLOOD PRESSURE: 155 MMHG | HEIGHT: 72 IN | RESPIRATION RATE: 14 BRPM | WEIGHT: 205 LBS | DIASTOLIC BLOOD PRESSURE: 79 MMHG | HEART RATE: 51 BPM

## 2023-09-19 DIAGNOSIS — M54.2 CERVICALGIA: ICD-10-CM

## 2023-09-19 PROCEDURE — 99214 OFFICE O/P EST MOD 30 MIN: CPT | Mod: 25

## 2023-09-19 PROCEDURE — 20553 NJX 1/MLT TRIGGER POINTS 3/>: CPT

## 2023-10-01 PROBLEM — L98.9 SKIN DISORDER: Status: ACTIVE | Noted: 2021-03-01

## 2023-10-11 ENCOUNTER — APPOINTMENT (OUTPATIENT)
Dept: DERMATOLOGY | Facility: CLINIC | Age: 68
End: 2023-10-11
Payer: MEDICARE

## 2023-10-11 PROCEDURE — 99214 OFFICE O/P EST MOD 30 MIN: CPT

## 2023-10-20 ENCOUNTER — TRANSCRIPTION ENCOUNTER (OUTPATIENT)
Age: 68
End: 2023-10-20

## 2023-10-23 ENCOUNTER — TRANSCRIPTION ENCOUNTER (OUTPATIENT)
Age: 68
End: 2023-10-23

## 2023-10-25 ENCOUNTER — APPOINTMENT (OUTPATIENT)
Dept: FAMILY MEDICINE | Facility: CLINIC | Age: 68
End: 2023-10-25
Payer: MEDICARE

## 2023-10-25 VITALS
OXYGEN SATURATION: 98 % | TEMPERATURE: 97.9 F | HEIGHT: 72 IN | HEART RATE: 50 BPM | DIASTOLIC BLOOD PRESSURE: 76 MMHG | WEIGHT: 203.38 LBS | BODY MASS INDEX: 27.55 KG/M2 | SYSTOLIC BLOOD PRESSURE: 114 MMHG

## 2023-10-25 DIAGNOSIS — Z00.00 ENCOUNTER FOR GENERAL ADULT MEDICAL EXAMINATION W/OUT ABNORMAL FINDINGS: ICD-10-CM

## 2023-10-25 DIAGNOSIS — M47.812 SPONDYLOSIS W/OUT MYELOPATHY OR RADICULOPATHY, CERVICAL REGION: ICD-10-CM

## 2023-10-25 DIAGNOSIS — Z23 ENCOUNTER FOR IMMUNIZATION: ICD-10-CM

## 2023-10-25 DIAGNOSIS — M25.562 PAIN IN LEFT KNEE: ICD-10-CM

## 2023-10-25 DIAGNOSIS — M79.18 MYALGIA, OTHER SITE: ICD-10-CM

## 2023-10-25 DIAGNOSIS — G89.29 PAIN IN LEFT KNEE: ICD-10-CM

## 2023-10-25 LAB
25(OH)D3 SERPL-MCNC: 33.5 NG/ML
ALBUMIN SERPL ELPH-MCNC: 4.4 G/DL
ALP BLD-CCNC: 75 U/L
ALT SERPL-CCNC: 33 U/L
ANION GAP SERPL CALC-SCNC: 12 MMOL/L
AST SERPL-CCNC: 34 U/L
BASOPHILS # BLD AUTO: 0.08 K/UL
BASOPHILS NFR BLD AUTO: 1.1 %
BILIRUB SERPL-MCNC: 0.8 MG/DL
BUN SERPL-MCNC: 14 MG/DL
CALCIUM SERPL-MCNC: 9.8 MG/DL
CHLORIDE SERPL-SCNC: 102 MMOL/L
CHOLEST SERPL-MCNC: 140 MG/DL
CO2 SERPL-SCNC: 26 MMOL/L
CREAT SERPL-MCNC: 0.9 MG/DL
EGFR: 94 ML/MIN/1.73M2
EOSINOPHIL # BLD AUTO: 0.07 K/UL
EOSINOPHIL NFR BLD AUTO: 0.9 %
ESTIMATED AVERAGE GLUCOSE: 117 MG/DL
GLUCOSE SERPL-MCNC: 97 MG/DL
HBA1C MFR BLD HPLC: 5.7 %
HCT VFR BLD CALC: 44.5 %
HDLC SERPL-MCNC: 54 MG/DL
HGB BLD-MCNC: 14.6 G/DL
IMM GRANULOCYTES NFR BLD AUTO: 0.7 %
LDLC SERPL CALC-MCNC: 66 MG/DL
LYMPHOCYTES # BLD AUTO: 1.28 K/UL
LYMPHOCYTES NFR BLD AUTO: 17.2 %
MAN DIFF?: NORMAL
MCHC RBC-ENTMCNC: 28.2 PG
MCHC RBC-ENTMCNC: 32.8 GM/DL
MCV RBC AUTO: 86.1 FL
MONOCYTES # BLD AUTO: 0.48 K/UL
MONOCYTES NFR BLD AUTO: 6.5 %
NEUTROPHILS # BLD AUTO: 5.47 K/UL
NEUTROPHILS NFR BLD AUTO: 73.6 %
NONHDLC SERPL-MCNC: 85 MG/DL
PLATELET # BLD AUTO: 214 K/UL
POTASSIUM SERPL-SCNC: 4.2 MMOL/L
PROT SERPL-MCNC: 6.5 G/DL
PSA SERPL-MCNC: 1.98 NG/ML
RBC # BLD: 5.17 M/UL
RBC # FLD: 13.4 %
SODIUM SERPL-SCNC: 140 MMOL/L
T4 FREE SERPL-MCNC: 1.2 NG/DL
TRIGL SERPL-MCNC: 105 MG/DL
TSH SERPL-ACNC: 1.99 UIU/ML
URATE SERPL-MCNC: 4.7 MG/DL
VIT B12 SERPL-MCNC: 927 PG/ML
WBC # FLD AUTO: 7.43 K/UL

## 2023-10-25 PROCEDURE — 99214 OFFICE O/P EST MOD 30 MIN: CPT | Mod: 25

## 2023-10-25 PROCEDURE — G0439: CPT

## 2023-10-25 RX ORDER — ZOLMITRIPTAN 5 MG/1
5 SPRAY NASAL TWICE DAILY
Qty: 1 | Refills: 0 | Status: DISCONTINUED | COMMUNITY
Start: 2023-05-31 | End: 2023-10-25

## 2023-10-25 RX ORDER — DICLOFENAC SODIUM 1% 10 MG/G
1 GEL TOPICAL
Qty: 2 | Refills: 3 | Status: ACTIVE | COMMUNITY
Start: 2023-10-25 | End: 1900-01-01

## 2023-10-26 ENCOUNTER — TRANSCRIPTION ENCOUNTER (OUTPATIENT)
Age: 68
End: 2023-10-26

## 2023-10-27 ENCOUNTER — TRANSCRIPTION ENCOUNTER (OUTPATIENT)
Age: 68
End: 2023-10-27

## 2023-11-01 ENCOUNTER — APPOINTMENT (OUTPATIENT)
Dept: PHYSICAL MEDICINE AND REHAB | Facility: CLINIC | Age: 68
End: 2023-11-01
Payer: MEDICARE

## 2023-11-01 VITALS
HEART RATE: 55 BPM | DIASTOLIC BLOOD PRESSURE: 54 MMHG | WEIGHT: 204 LBS | SYSTOLIC BLOOD PRESSURE: 155 MMHG | BODY MASS INDEX: 27.63 KG/M2 | HEIGHT: 72 IN

## 2023-11-01 PROCEDURE — 99214 OFFICE O/P EST MOD 30 MIN: CPT

## 2023-11-10 DIAGNOSIS — M25.462 EFFUSION, LEFT KNEE: ICD-10-CM

## 2023-11-10 DIAGNOSIS — S83.249A OTHER TEAR OF MEDIAL MENISCUS, CURRENT INJURY, UNSPECIFIED KNEE, INITIAL ENCOUNTER: ICD-10-CM

## 2023-11-10 DIAGNOSIS — S83.519A SPRAIN OF ANTERIOR CRUCIATE LIGAMENT OF UNSPECIFIED KNEE, INITIAL ENCOUNTER: ICD-10-CM

## 2023-11-10 DIAGNOSIS — M17.5 OTHER UNILATERAL SECONDARY OSTEOARTHRITIS OF KNEE: ICD-10-CM

## 2023-11-14 ENCOUNTER — TRANSCRIPTION ENCOUNTER (OUTPATIENT)
Age: 68
End: 2023-11-14

## 2023-11-27 ENCOUNTER — TRANSCRIPTION ENCOUNTER (OUTPATIENT)
Age: 68
End: 2023-11-27

## 2023-12-08 DIAGNOSIS — M17.10 UNILATERAL PRIMARY OSTEOARTHRITIS, UNSPECIFIED KNEE: ICD-10-CM

## 2024-01-08 ENCOUNTER — TRANSCRIPTION ENCOUNTER (OUTPATIENT)
Age: 69
End: 2024-01-08

## 2024-01-10 ENCOUNTER — TRANSCRIPTION ENCOUNTER (OUTPATIENT)
Age: 69
End: 2024-01-10

## 2024-01-11 ENCOUNTER — TRANSCRIPTION ENCOUNTER (OUTPATIENT)
Age: 69
End: 2024-01-11

## 2024-01-16 ENCOUNTER — TRANSCRIPTION ENCOUNTER (OUTPATIENT)
Age: 69
End: 2024-01-16

## 2024-01-17 ENCOUNTER — TRANSCRIPTION ENCOUNTER (OUTPATIENT)
Age: 69
End: 2024-01-17

## 2024-01-18 DIAGNOSIS — R93.1 ABNORMAL FINDINGS ON DIAGNOSTIC IMAGING OF HEART AND CORONARY CIRCULATION: ICD-10-CM

## 2024-01-31 ENCOUNTER — APPOINTMENT (OUTPATIENT)
Dept: GASTROENTEROLOGY | Facility: CLINIC | Age: 69
End: 2024-01-31
Payer: MEDICARE

## 2024-01-31 VITALS
BODY MASS INDEX: 28.17 KG/M2 | RESPIRATION RATE: 14 BRPM | DIASTOLIC BLOOD PRESSURE: 83 MMHG | WEIGHT: 208 LBS | HEART RATE: 56 BPM | OXYGEN SATURATION: 98 % | SYSTOLIC BLOOD PRESSURE: 154 MMHG | HEIGHT: 72 IN

## 2024-01-31 DIAGNOSIS — R91.1 SOLITARY PULMONARY NODULE: ICD-10-CM

## 2024-01-31 DIAGNOSIS — E78.5 HYPERLIPIDEMIA, UNSPECIFIED: ICD-10-CM

## 2024-01-31 DIAGNOSIS — G43.809 OTHER MIGRAINE, NOT INTRACTABLE, W/OUT STATUS MIGRAINOSUS: ICD-10-CM

## 2024-01-31 DIAGNOSIS — H90.3 SENSORINEURAL HEARING LOSS, BILATERAL: ICD-10-CM

## 2024-01-31 DIAGNOSIS — B07.9 VIRAL WART, UNSPECIFIED: ICD-10-CM

## 2024-01-31 DIAGNOSIS — M19.90 UNSPECIFIED OSTEOARTHRITIS, UNSPECIFIED SITE: ICD-10-CM

## 2024-01-31 DIAGNOSIS — R35.1 BENIGN PROSTATIC HYPERPLASIA WITH LOWER URINARY TRACT SYMPMS: ICD-10-CM

## 2024-01-31 DIAGNOSIS — M47.816 SPONDYLOSIS W/OUT MYELOPATHY OR RADICULOPATHY, LUMBAR REGION: ICD-10-CM

## 2024-01-31 DIAGNOSIS — M47.812 SPONDYLOSIS W/OUT MYELOPATHY OR RADICULOPATHY, CERVICAL REGION: ICD-10-CM

## 2024-01-31 DIAGNOSIS — M71.20 SYNOVIAL CYST OF POPLITEAL SPACE [BAKER], UNSPECIFIED KNEE: ICD-10-CM

## 2024-01-31 DIAGNOSIS — E55.9 VITAMIN D DEFICIENCY, UNSPECIFIED: ICD-10-CM

## 2024-01-31 DIAGNOSIS — R73.03 PREDIABETES.: ICD-10-CM

## 2024-01-31 DIAGNOSIS — E79.0 HYPERURICEMIA W/OUT SIGNS OF INFLAMMATORY ARTHRITIS AND TOPHACEOUS DISEASE: ICD-10-CM

## 2024-01-31 DIAGNOSIS — M60.9 MYOSITIS, UNSPECIFIED: ICD-10-CM

## 2024-01-31 DIAGNOSIS — N62 HYPERTROPHY OF BREAST: ICD-10-CM

## 2024-01-31 DIAGNOSIS — N40.1 BENIGN PROSTATIC HYPERPLASIA WITH LOWER URINARY TRACT SYMPMS: ICD-10-CM

## 2024-01-31 DIAGNOSIS — R00.1 BRADYCARDIA, UNSPECIFIED: ICD-10-CM

## 2024-01-31 PROCEDURE — 99213 OFFICE O/P EST LOW 20 MIN: CPT

## 2024-01-31 RX ORDER — SODIUM SULFATE, POTASSIUM SULFATE AND MAGNESIUM SULFATE 1.6; 3.13; 17.5 G/177ML; G/177ML; G/177ML
17.5-3.13-1.6 SOLUTION ORAL
Qty: 1 | Refills: 0 | Status: ACTIVE | COMMUNITY
Start: 2024-01-31 | End: 1900-01-01

## 2024-01-31 NOTE — PHYSICAL EXAM
[Alert] : alert [Normal Voice/Communication] : normal voice/communication [Healthy Appearing] : healthy appearing [No Acute Distress] : no acute distress [Sclera] : the sclera and conjunctiva were normal [Hearing Threshold Finger Rub Not [place]] : hearing was normal [Normal Lips/Gums] : the lips and gums were normal [Oropharynx] : the oropharynx was normal [Normal Appearance] : the appearance of the neck was normal [No Respiratory Distress] : no respiratory distress [No Acc Muscle Use] : no accessory muscle use [Respiration, Rhythm And Depth] : normal respiratory rhythm and effort [Auscultation Breath Sounds / Voice Sounds] : lungs were clear to auscultation bilaterally [Heart Rate And Rhythm] : heart rate was normal and rhythm regular [Normal S1, S2] : normal S1 and S2 [Murmurs] : no murmurs [Bowel Sounds] : normal bowel sounds [Abdomen Tenderness] : non-tender [No Masses] : no abdominal mass palpated [Abdomen Soft] : soft [] : no hepatosplenomegaly [Oriented To Time, Place, And Person] : oriented to person, place, and time

## 2024-01-31 NOTE — ASSESSMENT
[FreeTextEntry1] : At this time he is due for follow-up colonoscopy which will be scheduled.  He will obtain a CBC, basic metabolic profile and prothrombin time prior. The risks, benefits, complications and possible adverse consequences associated with colonoscopy were discussed with the patient.

## 2024-01-31 NOTE — HISTORY OF PRESENT ILLNESS
[FreeTextEntry1] : In 2016 the patient underwent a screening colonoscopy by Dr. Araiza that was normal other than a 3 mm rectal polyp which was removed and was hyperplastic.  He is feeling well with no complaints.  He denies any abdominal pain, nausea, vomiting, diarrhea or constipation.  There is no blood per rectum.  His appetite and weight are stable.  There is no family history of colon cancer.

## 2024-01-31 NOTE — REASON FOR VISIT
[Initial Evaluation] : an initial evaluation [FreeTextEntry1] : prior polyp and colon cancer screening

## 2024-02-05 ENCOUNTER — TRANSCRIPTION ENCOUNTER (OUTPATIENT)
Age: 69
End: 2024-02-05

## 2024-02-26 ENCOUNTER — TRANSCRIPTION ENCOUNTER (OUTPATIENT)
Age: 69
End: 2024-02-26

## 2024-02-27 ENCOUNTER — TRANSCRIPTION ENCOUNTER (OUTPATIENT)
Age: 69
End: 2024-02-27

## 2024-02-28 ENCOUNTER — TRANSCRIPTION ENCOUNTER (OUTPATIENT)
Age: 69
End: 2024-02-28

## 2024-03-11 ENCOUNTER — APPOINTMENT (OUTPATIENT)
Dept: FAMILY MEDICINE | Facility: CLINIC | Age: 69
End: 2024-03-11
Payer: MEDICARE

## 2024-03-11 DIAGNOSIS — M54.81 OCCIPITAL NEURALGIA: ICD-10-CM

## 2024-03-11 DIAGNOSIS — G43.E09 CHRONIC MIGRAINE WITH AURA, NOT INTRACTABLE, WITHOUT STATUS MIGRAINOSUS: ICD-10-CM

## 2024-03-11 PROCEDURE — 99443: CPT | Mod: 93

## 2024-03-11 RX ORDER — GABAPENTIN 300 MG/1
300 CAPSULE ORAL
Qty: 180 | Refills: 1 | Status: ACTIVE | COMMUNITY
Start: 2023-10-25 | End: 1900-01-01

## 2024-03-11 NOTE — PLAN
[FreeTextEntry1] : Start Ubrelvy. Continue Gabapentin. May take Tylenol and / or Advil prn pain. Warm compresses back of the neck prn. F/U 1 month. Start Time: 12:40 pm End Time:1:15 pm

## 2024-03-11 NOTE — HISTORY OF PRESENT ILLNESS
[Home] : at home, [unfilled] , at the time of the visit. [Medical Office: ([facility])___] : at the medical office located in  [Verbal consent obtained from patient] : the patient, [unfilled] [FreeTextEntry1] : Pt c/o bad headaches again. Headaches are located on the right side of the head and behind the right eye. Right eye sometimes will tear and occasionally has sinus congestion on right. Has had w/u in the past and dxed with occipital migraine with cervicogenic component vs possible cluster headache. He has tried Zolmitriptan NS in the past without effect. Headaches get severe at times. Has had this for 20 yrs. Headaches typically occur in monthly spurts and recur daily for a few weeks. Has been taking gabapentin at  which helps him sleep due to partial pain relief. He would like to try acupuncture.

## 2024-03-13 ENCOUNTER — APPOINTMENT (OUTPATIENT)
Dept: PHYSICAL MEDICINE AND REHAB | Facility: CLINIC | Age: 69
End: 2024-03-13
Payer: MEDICARE

## 2024-03-13 VITALS
BODY MASS INDEX: 27.9 KG/M2 | DIASTOLIC BLOOD PRESSURE: 84 MMHG | HEIGHT: 72 IN | SYSTOLIC BLOOD PRESSURE: 137 MMHG | RESPIRATION RATE: 14 BRPM | WEIGHT: 206 LBS | HEART RATE: 50 BPM

## 2024-03-13 DIAGNOSIS — M25.562 PAIN IN RIGHT KNEE: ICD-10-CM

## 2024-03-13 DIAGNOSIS — M25.511 PAIN IN RIGHT SHOULDER: ICD-10-CM

## 2024-03-13 DIAGNOSIS — M19.011 PRIMARY OSTEOARTHRITIS, RIGHT SHOULDER: ICD-10-CM

## 2024-03-13 DIAGNOSIS — G89.29 PAIN IN RIGHT SHOULDER: ICD-10-CM

## 2024-03-13 DIAGNOSIS — M25.561 PAIN IN RIGHT KNEE: ICD-10-CM

## 2024-03-13 PROCEDURE — 99214 OFFICE O/P EST MOD 30 MIN: CPT

## 2024-03-13 RX ORDER — UBROGEPANT 50 MG/1
50 TABLET ORAL
Qty: 8 | Refills: 1 | Status: ACTIVE | COMMUNITY
Start: 2024-03-11 | End: 1900-01-01

## 2024-03-13 NOTE — PHYSICAL EXAM
[FreeTextEntry1] : PE: Constitutional: In NAD, calm and cooperative MSK (R Shoulder): 	Inspection: no gross swelling identified 	Palpation: No significant TTP  	ROM: Extremely Restricted IE, ER and Shoulder Abduction 	Strength: 5/5 strength in bilateral upper extremities except for R shoulder abduction which is limited to examine due to pain 	Sensation: Grossly Intact to light touch in bilateral upper extremities Special tests:  Neer's test: unable to test on R due to pain Hawkin's sign: positive on R  Bilateral knees: mild swelling of L knee compard to R knee, mild-moderate crepitus throughout ROM bilaterally, Valgus/varus testing negative bilaterally

## 2024-03-13 NOTE — HISTORY OF PRESENT ILLNESS
[FreeTextEntry1] : Mr. RICHARD MAGNANI is a 68 year old  male who presents for follow up. At last visit, he was advised to f/u with Ortho for knee pain and continued on HEP for neck pain. He was doing well but has progressively worsened, especially with playing pickleball.   Location: R Shoulder bilateral knees, L>R knees Onset: Chronic,for years, worsened over last few weeks after playing extensive pickleball. Provocation/Palliative: Worse with activity, better with rest Quality:Achy Radiation:None Severity:Moderate to severe with aactivity Timing:L knee pain improved in past with CSI

## 2024-03-13 NOTE — DATA REVIEWED
[FreeTextEntry1] : PATIENT NAME: Magnani, Richard PATIENT PHONE NUMBER: [phone] PATIENT ID: [id number] : 1955 DATE OF EXAM: 10/14/2020 R. Phys. Name: Jovani Burns R. Phys. Address: [address] [place] [address] R. Phys. Phone: [phone] MRI-3T RIGHT SHOULDER NON CONTRAST  History: M25.511 Right shoulder pain M75.81 Other shoulder lesions, right shoulder  Technique: MRI of the right shoulder was performed on a 3.0 Cora ultra high field wide bore magnet with multiple sequences including proton density with and without fat suppression in oblique sagittal and oblique coronal planes and proton density and gradient echo in axial plane.  Findings:  There is supraspinatus tendon demonstrates mild tendinosis and low-grade articular side/partial insertion tear demonstrate mid substance resulting in tendon attenuation. There is no high-grade partial or full-thickness tear or muscle atrophy. Infraspinatus demonstrates mild insertional tendinosis and fraying at articular aspect. Teres minor is intact. Subscapularis demonstrates mild tendinosis. There is also mild tenosynovitis of the long head of the biceps tendon.  There are moderate to severe arthritic changes of the glenohumeral joint with loss of articular cartilage and low-grade chronic erosive change of subchondral bone of posterior surface of at the glenoid. Subarticular cystic changes are present scattered the glenoid for pronounced inferiorly as well as lateral humeral head. Medial humeral head osteophyte is present. There is moderate joint effusion with chronic synovitis. An irregular osteochondral loose bodies present posterosuperiorly.  An os acromiale is present. There are mild degenerative changes of the acromioclavicular joint. There is mild subacromial subdeltoid bursitis.  There is no fracture or bone contusion. The medullary bone marrow signal intensity is normal. The periarticular musculature demonstrate normal signal intensity and no evidence for denervation edema or atrophy.  IMPRESSION:   Moderately advanced glenohumeral joint osteoarthritis with effusion and chronic synovitis and osteochondral loose body.  Tendinosis and small shallow partial tear of supraspinatus tendon. Mild tendinosis of infraspinatus and subscapularis tendons. All tenosynovitis of long head of the biceps tendon.  Osseous acromial. Mild acromioclavicular joint degenerative disease. Mild subacromial subdeltoid bursitis.  Signed by: Alyson Lozano MD Signed Date: 10/16/2020 11:20 AM EDT   SIGNED BY: Alyson Lozano M.D., Ext. [phone] 10/16/2020 11:20 AM   PATIENT NAME: Magnani, Richard PATIENT PHONE NUMBER: [phone] PATIENT ID: [id number] : 1955 DATE OF EXAM: 10/25/2023 R. Phys. Name: Katy Astudillo Mary R. Phys. Address: [address] [place] [address] R. Phys. Phone: [phone] MRI-LEFT KNEE NON CONTRAST  History: Acute on chronic left knee pain. Concern for meniscus tear. The patient provides a history of injury on 10/20/2023, and a history of left knee surgery.  M25.157 M25.607  Comparison Studies: Left knee radiographs from the same day.  Technique: The left knee was imaged in a 1.5 Cora high field magnetic resonance imaging unit. Fat-saturated proton density images were obtained in the axial, coronal and sagittal planes together with sagittal proton density images.  Findings:  There is minimal anterior subcutaneous edema.  There is a moderate joint effusion.  There is a small-to-moderate popliteal cyst. There is limited partial decompression of the cyst along the superficial margin of the medial gastrocnemius, axial series 8, image 26-40.  The extensor retinaculum is unremarkable.  The extensor mechanism is unremarkable.  There is a low-grade ACL sprain, without ligament tear. Correlate with clinical assessment of joint stability.  The PCL is unremarkable.  The collateral ligaments are unremarkable.  The iliotibial band and biceps femoris tendon are unremarkable.  The popliteus muscle-tendon unit is unremarkable.  There is complex degenerative tearing of the posterior horn and body segments of the medial meniscus, coronal series 12, image 19-26 and sagittal series 10, image 8-15.  There is extensive horizontal signal throughout all segments of the lateral meniscus. Some of this correlates with chondrocalcinosis on the radiographs. Associated tearing extends to the free edge and the inner surfaces of the anterior horn; to the inferior surface of the posterior horn; and to the superior surface and free edge of the body segment.  Osteoarthritis is severe in the patellofemoral compartment, mild in the medial lateral compartments.  There is no evidence of stress injury, fracture, osteonecrosis or osseous neoplasm.  The joint capsule is unremarkable.  The remaining visualized muscle-tendon units are unremarkable.  No soft tissue mass is visualized.  IMPRESSION:  Tricompartmental osteoarthritis, worse in the patellofemoral compartment.  Moderate joint effusion.  Small to moderate popliteal cyst as noted.  Low-grade ACL sprain, without tear. Correlate with clinical assessment of joint stability.  Complex degenerative tear of the medial meniscus.  Extensive lateral meniscus tear, with superimposed chondrocalcinosis.  M17.12 M25.462 M71.22 S83.512A S83.242A S83.282A  Signed by: Lauri Rojas MD Signed Date: 10/26/2023 4:15 PM EDT    SIGNED BY: Lauri Rojas M.D., Ext. [phone] 10/26/2023 04:15 PM   PATIENT NAME: Magnani, Richard PATIENT PHONE NUMBER: [phone] PATIENT ID: [id number] : 1955 DATE OF EXAM: 10/25/2023 R. Phys. Name: Katy Astudillo Mary R. Phys. Address: [address] [place] [address] R. Phys. Phone: [phone] LEFT KNEE XRAY AP AND LATERAL WITH OBLIQUE 3 VIEWS  HISTORY: M25.562 Left knee pain  AP, lateral, and oblique views of the left knee are obtained.  There is no fracture, subluxation or dislocation. No osteoblastic or osteolytic lesions can be identified. There are degenerative changes of the patellofemoral, medial, and lateral joint compartments of the knee with narrowing of the patellofemoral and medial joint compartments of the knee. There is chondrocalcinosis. The subarticular cortex is smooth. A joint effusion is demonstrated.  IMPRESSION: There are osteoarthritic degenerative changes. M17.12   Chondrocalcinosis M11.262  Joint effusion  There are no acute fractures.  Signed by: Isabela Steele MD Signed Date: 10/26/2023 3:09 PM EDT    SIGNED BY: Isabela Steele M.D., Ext. [phone] 10/26/2023 03:09 PM

## 2024-03-13 NOTE — ASSESSMENT
[FreeTextEntry1] : Mr. RICHARD MAGNANI is a 68 year old male who presents with chronic R Shoulder pain and L>R knee pain, likely due to underlying OA. Will recommend: - MRI L knee, X-ray L knee and MRI R Shoulder reviewed - Advised consultation with Ortho Joint for his continued L knee pain and severe patellofemoral compartment OA. Also advised f/u with Dr. Denton for R shoulder replacement consultation given severely limited ROM and pain.  - Advised Tylenol up to 3g/day if needed.   RTC as needed. Patient in agreement with plan.

## 2024-03-14 ENCOUNTER — TRANSCRIPTION ENCOUNTER (OUTPATIENT)
Age: 69
End: 2024-03-14

## 2024-03-15 ENCOUNTER — TRANSCRIPTION ENCOUNTER (OUTPATIENT)
Age: 69
End: 2024-03-15

## 2024-03-18 ENCOUNTER — TRANSCRIPTION ENCOUNTER (OUTPATIENT)
Age: 69
End: 2024-03-18

## 2024-05-20 ENCOUNTER — RX RENEWAL (OUTPATIENT)
Age: 69
End: 2024-05-20

## 2024-05-20 RX ORDER — ALLOPURINOL 100 MG/1
100 TABLET ORAL
Qty: 180 | Refills: 1 | Status: ACTIVE | COMMUNITY
Start: 2019-07-29 | End: 1900-01-01

## 2024-05-22 ENCOUNTER — APPOINTMENT (OUTPATIENT)
Dept: ORTHOPEDIC SURGERY | Facility: CLINIC | Age: 69
End: 2024-05-22
Payer: MEDICARE

## 2024-05-22 DIAGNOSIS — M25.612 STIFFNESS OF LEFT SHOULDER, NOT ELSEWHERE CLASSIFIED: ICD-10-CM

## 2024-05-22 PROCEDURE — 99204 OFFICE O/P NEW MOD 45 MIN: CPT

## 2024-05-22 NOTE — HISTORY OF PRESENT ILLNESS
[de-identified] : This is a 69 y/o male presenting to the office c/o ongoing left shoulder pain since today. Pt went to [facility] and x-rays were obtained.  Pain is described as constant, severe in quality. Currently pain is 8/10 and non-radiating. Patient reports pain has been getting progressively worse. Pain is worse at night. Overall pain does improve with rest and ice. Patient denies any numbness or tingling. Pt is RHD ~Pt with acute Left AC joint separation (Type III)

## 2024-05-22 NOTE — DISCUSSION/SUMMARY
[de-identified] : This is a 69 y/o male presenting to the office c/o ongoing left shoulder pain  Outside x-rays demonstrate type 3 AC joint separation Sling immobilization recommended for 6 weeks Surgery discussed if patient fails to make improvement, but it is not warranted at this time  Ice recommended Follow up 3 weeks Discussed that surgical reconstruction is likely not warranted, patient will have persistent deformity but hopefully pain will be improved.  Could take 2 to 3 months to fully recover.  Patient has underlying glenohumeral arthritis as well.  Patient going to [facility] for right shoulder surgical consult due to arthritis.  (1) We discussed a comprehensive treatment plans that included a prescription management plan involving the use of prescription strength medications to include Ibuprofen 600-800 mg TID, versus 500-650 mg Tylenol. We also discussed prescribing topical diclofenac (Voltaren gel) as well as once daily Meloxicam 15 mg. (2) The patient has More Than One chronic injuries/illnesses as outlined, discussed, and documented by ICD 10 codes listed, as well as the HPI and Plan section. There is a moderate risk of morbidity with further treatment, especially from use of prescription strength medications and possible side effects of these medications which include upset stomach and cardiac/renal issues with long term use were discussed. (3) I recommended that the patient follow-up with their medical physician to discuss any significant specific potential issues with long term use such as interactions with current medications or with exacerbation of underlying medical morbidities.   Attestation: I, Traci Whatley , attest that this documentation has been prepared under the direction and in the presence of Provider Odell Yu MD. The documentation recorded by the scribe, in my presence, accurately reflects the service I personally performed, and the decisions made by me with my edits as appropriate. Odell Yu MD

## 2024-05-22 NOTE — PHYSICAL EXAM
[de-identified] : Constitutional: The general appearance of the patient is well developed, well nourished, no deformities and well groomed. Normal   Gait: Gait and function is as follows: normal gait.   Skin: Head and neck visualized skin is normal. Left upper extremity visualized skin is normal. Right upper extremity visualized skin is normal. Thoracic Skin of the thoracic spine shows visualized skin is normal.   Cardiovascular: palpable radial pulse bilaterally, good capillary refill in digits of the bilateral upper extremities and no temperature or color changes in the bilateral upper extremities.   Lymphatic: Normal Palpation of lymph nodes in the cervical.   Neurologic: fine motor control in the bilateral upper extremities is intact. Deep Tendon Reflexes in Upper and Lower Extremities Negative Cherry's in the bilateral upper extremities. The patient is oriented to time, place and person. Sensation to light touch intact in the bilateral upper extremities. Mood and Affect is normal.   Right Shoulder: Inspection of the shoulder/upper arm is as follows: There is mild pain with range of motion of the shoulder   Left Shoulder: Inspection of the shoulder/upper arm is as follows: no scapula winging, no biceps deformity and no AC joint deformity. Palpation of the shoulder/upper arm is as follows: There is tenderness at the proximal biceps tendon. Range of motion of the shoulder is as follows: Pain with internal rotation, external rotation, abduction and forward flexion. Strength of the shoulder is as follows: Supraspinatus 4/5. External Rotation 4/5. Internal Rotation 4/5. Deltoid 5/5 Ligament Stability and Special Tests of the shoulder is as follows: Neer test is positive. Rodrigues' test is positive. Speed's test is positive.   Neck:   Inspection / Palpation of the cervical spine is as follows: mild paracervical tenderness. Range of motion of the cervical spine is as follows: moderately decreased range of motion of the cervical spine. Stability testing for the cervical spine is as follows Stable range of motion.   Back, including spine: Inspection / Palpation of the thoracic/lumbar spine is as follows: There is a full, pain free, stable range of motion of the thoracic spine with a normal tone and not tenderness to palpation..

## 2024-06-12 ENCOUNTER — APPOINTMENT (OUTPATIENT)
Dept: ORTHOPEDIC SURGERY | Facility: CLINIC | Age: 69
End: 2024-06-12
Payer: MEDICARE

## 2024-06-12 DIAGNOSIS — M25.512 PAIN IN LEFT SHOULDER: ICD-10-CM

## 2024-06-12 DIAGNOSIS — M19.012 PRIMARY OSTEOARTHRITIS, LEFT SHOULDER: ICD-10-CM

## 2024-06-12 PROCEDURE — 99214 OFFICE O/P EST MOD 30 MIN: CPT

## 2024-06-12 NOTE — PHYSICAL EXAM
[de-identified] : Constitutional: The general appearance of the patient is well developed, well nourished, no deformities and well groomed. Normal   Gait: Gait and function is as follows: normal gait.   Skin: Head and neck visualized skin is normal. Left upper extremity visualized skin is normal. Right upper extremity visualized skin is normal. Thoracic Skin of the thoracic spine shows visualized skin is normal.   Cardiovascular: palpable radial pulse bilaterally, good capillary refill in digits of the bilateral upper extremities and no temperature or color changes in the bilateral upper extremities.   Lymphatic: Normal Palpation of lymph nodes in the cervical.   Neurologic: fine motor control in the bilateral upper extremities is intact. Deep Tendon Reflexes in Upper and Lower Extremities Negative Cherry's in the bilateral upper extremities. The patient is oriented to time, place and person. Sensation to light touch intact in the bilateral upper extremities. Mood and Affect is normal.   Right Shoulder: Inspection of the shoulder/upper arm is as follows: There is mild pain with range of motion of the shoulder   Left Shoulder: Inspection of the shoulder/upper arm is as follows: no scapula winging, no biceps deformity and no AC joint deformity. Palpation of the shoulder/upper arm is as follows: There is tenderness at the proximal biceps tendon. Range of motion of the shoulder is as follows: Pain with internal rotation, external rotation, abduction and forward flexion. Strength of the shoulder is as follows: Supraspinatus 4/5. External Rotation 4/5. Internal Rotation 4/5. Deltoid 5/5 Ligament Stability and Special Tests of the shoulder is as follows: Neer test is positive. Rodrigues' test is positive. Speed's test is positive.   Neck:   Inspection / Palpation of the cervical spine is as follows: mild paracervical tenderness. Range of motion of the cervical spine is as follows: moderately decreased range of motion of the cervical spine. Stability testing for the cervical spine is as follows Stable range of motion.   Back, including spine: Inspection / Palpation of the thoracic/lumbar spine is as follows: There is a full, pain free, stable range of motion of the thoracic spine with a normal tone and not tenderness to palpation..

## 2024-06-12 NOTE — DISCUSSION/SUMMARY
[de-identified] : This is a 67 y/o male presenting to the office c/o ongoing left shoulder pain  Outside x-rays demonstrate type 3 AC joint separation Sling immobilization recommended for 3 weeks Supine FF demonstrated to maximize range of motion  Follow up 4 weeks consider formal course of PT  Discussed that surgical reconstruction is likely not warranted, patient will have persistent deformity but hopefully pain will be improved.  Could take 2 to 3 months to fully recover.  Patient has underlying glenohumeral arthritis as well.    I did discuss that I did discuss with the patient that he has a very unique injury and that he has advanced glenohumeral arthritis and a type III AC I recommended relative sling immobilization for an additional 2 weeks with supine forward flexion exercises as tolerated.  He will follow-up in approximately 1 month which will be about 7 weeks from injury and if he still having retroscapular or paracervical issues we would recommend physical therapy.   (1) We discussed a comprehensive treatment plans that included a prescription management plan involving the use of prescription strength medications to include Ibuprofen 600-800 mg TID, versus 500-650 mg Tylenol. We also discussed prescribing topical diclofenac (Voltaren gel) as well as once daily Meloxicam 15 mg. (2) The patient has More Than One chronic injuries/illnesses as outlined, discussed, and documented by ICD 10 codes listed, as well as the HPI and Plan section. There is a moderate risk of morbidity with further treatment, especially from use of prescription strength medications and possible side effects of these medications which include upset stomach and cardiac/renal issues with long term use were discussed. (3) I recommended that the patient follow-up with their medical physician to discuss any significant specific potential issues with long term use such as interactions with current medications or with exacerbation of underlying medical morbidities.   Attestation: I, Traci Whatley , attest that this documentation has been prepared under the direction and in the presence of Provider Odell Yu MD. The documentation recorded by the scribe, in my presence, accurately reflects the service I personally performed, and the decisions made by me with my edits as appropriate. Odell Yu MD

## 2024-06-12 NOTE — HISTORY OF PRESENT ILLNESS
[de-identified] : This is a 67 y/o male presenting to the office c/o ongoing left shoulder pain since today. Pt went to  and x-rays were obtained.  Pain is described as constant, severe in quality. Currently pain is 8/10 and non-radiating. Patient reports pain has been getting progressively worse. Pain is worse at night. Overall pain does improve with rest and ice. Patient denies any numbness or tingling. Pt is RHD ~Pt with acute Left AC joint separation (Type III)  6/12/24: Patient here for left shoulder pain. Pt is 3 weeks out from initial injury. Patient admits to continued tenderness on the AC joint.

## 2024-06-17 ENCOUNTER — TRANSCRIPTION ENCOUNTER (OUTPATIENT)
Age: 69
End: 2024-06-17

## 2024-06-18 ENCOUNTER — OUTPATIENT (OUTPATIENT)
Dept: OUTPATIENT SERVICES | Facility: HOSPITAL | Age: 69
LOS: 1 days | End: 2024-06-18
Payer: MEDICARE

## 2024-06-18 ENCOUNTER — APPOINTMENT (OUTPATIENT)
Dept: GASTROENTEROLOGY | Facility: GI CENTER | Age: 69
End: 2024-06-18
Payer: MEDICARE

## 2024-06-18 ENCOUNTER — RESULT REVIEW (OUTPATIENT)
Age: 69
End: 2024-06-18

## 2024-06-18 DIAGNOSIS — K63.5 POLYP OF COLON: ICD-10-CM

## 2024-06-18 DIAGNOSIS — Z12.11 ENCOUNTER FOR SCREENING FOR MALIGNANT NEOPLASM OF COLON: ICD-10-CM

## 2024-06-18 PROCEDURE — 45380 COLONOSCOPY AND BIOPSY: CPT | Mod: XS,PT

## 2024-06-18 PROCEDURE — 88305 TISSUE EXAM BY PATHOLOGIST: CPT

## 2024-06-18 PROCEDURE — 88305 TISSUE EXAM BY PATHOLOGIST: CPT | Mod: 26

## 2024-06-18 PROCEDURE — 45385 COLONOSCOPY W/LESION REMOVAL: CPT | Mod: PT

## 2024-06-18 NOTE — PHYSICAL EXAM
[Alert] : alert [Normal Voice/Communication] : normal voice/communication [Healthy Appearing] : healthy appearing [No Acute Distress] : no acute distress [Sclera] : the sclera and conjunctiva were normal [Hearing Threshold Finger Rub Not [place]] : hearing was normal [Normal Lips/Gums] : the lips and gums were normal [Oropharynx] : the oropharynx was normal [Normal Appearance] : the appearance of the neck was normal [No Respiratory Distress] : no respiratory distress [No Acc Muscle Use] : no accessory muscle use [Respiration, Rhythm And Depth] : normal respiratory rhythm and effort [Auscultation Breath Sounds / Voice Sounds] : lungs were clear to auscultation bilaterally [Heart Rate And Rhythm] : heart rate was normal and rhythm regular [Normal S1, S2] : normal S1 and S2 [Murmurs] : no murmurs [Bowel Sounds] : normal bowel sounds [Abdomen Tenderness] : non-tender [No Masses] : no abdominal mass palpated [Abdomen Soft] : soft [] : no hepatosplenomegaly [Oriented To Time, Place, And Person] : oriented to person, place, and time

## 2024-06-20 LAB — SURGICAL PATHOLOGY STUDY: SIGNIFICANT CHANGE UP

## 2024-07-10 ENCOUNTER — APPOINTMENT (OUTPATIENT)
Dept: ORTHOPEDIC SURGERY | Facility: CLINIC | Age: 69
End: 2024-07-10
Payer: MEDICARE

## 2024-07-10 DIAGNOSIS — M25.512 PAIN IN LEFT SHOULDER: ICD-10-CM

## 2024-07-10 DIAGNOSIS — G25.89 OTHER SPECIFIED EXTRAPYRAMIDAL AND MOVEMENT DISORDERS: ICD-10-CM

## 2024-07-10 DIAGNOSIS — M19.012 PRIMARY OSTEOARTHRITIS, LEFT SHOULDER: ICD-10-CM

## 2024-07-10 PROCEDURE — 99214 OFFICE O/P EST MOD 30 MIN: CPT

## 2024-09-25 ENCOUNTER — RX RENEWAL (OUTPATIENT)
Age: 69
End: 2024-09-25

## 2024-10-28 ENCOUNTER — APPOINTMENT (OUTPATIENT)
Dept: FAMILY MEDICINE | Facility: CLINIC | Age: 69
End: 2024-10-28

## 2025-05-24 ENCOUNTER — NON-APPOINTMENT (OUTPATIENT)
Age: 70
End: 2025-05-24

## 2025-05-28 ENCOUNTER — APPOINTMENT (OUTPATIENT)
Dept: DERMATOLOGY | Facility: CLINIC | Age: 70
End: 2025-05-28
Payer: MEDICARE

## 2025-05-28 PROCEDURE — 99213 OFFICE O/P EST LOW 20 MIN: CPT

## (undated) DEVICE — FORCEP RADIAL JAW 4 JUMBO W NDL 2.8MM 3.2MM 240CM ORANGE DISP

## (undated) DEVICE — POLY TRAP ETRAP

## (undated) DEVICE — SNARE CAPTIVATOR RND COLD STIFF 2.4X10MM 240CM

## (undated) DEVICE — VALVE ENDO SURESEAL II 0-5FR